# Patient Record
Sex: FEMALE | Race: BLACK OR AFRICAN AMERICAN | NOT HISPANIC OR LATINO | Employment: OTHER | URBAN - NONMETROPOLITAN AREA
[De-identification: names, ages, dates, MRNs, and addresses within clinical notes are randomized per-mention and may not be internally consistent; named-entity substitution may affect disease eponyms.]

---

## 2019-10-08 LAB
PAP RECOMMENDATION EXT: NORMAL
PAP SMEAR: NORMAL

## 2021-07-14 ENCOUNTER — OFFICE VISIT (OUTPATIENT)
Dept: FAMILY MEDICINE | Facility: CLINIC | Age: 31
End: 2021-07-14
Payer: MEDICAID

## 2021-07-14 VITALS
DIASTOLIC BLOOD PRESSURE: 84 MMHG | SYSTOLIC BLOOD PRESSURE: 128 MMHG | HEART RATE: 80 BPM | WEIGHT: 290.5 LBS | OXYGEN SATURATION: 98 % | RESPIRATION RATE: 16 BRPM | BODY MASS INDEX: 49.6 KG/M2 | HEIGHT: 64 IN | TEMPERATURE: 97 F

## 2021-07-14 DIAGNOSIS — E66.9 OBESITY, UNSPECIFIED CLASSIFICATION, UNSPECIFIED OBESITY TYPE, UNSPECIFIED WHETHER SERIOUS COMORBIDITY PRESENT: ICD-10-CM

## 2021-07-14 DIAGNOSIS — Z00.00 WELLNESS EXAMINATION: Primary | ICD-10-CM

## 2021-07-14 LAB
ALBUMIN SERPL BCP-MCNC: 3.5 G/DL (ref 3.5–5)
ALBUMIN/GLOB SERPL: 1.1 {RATIO}
ALP SERPL-CCNC: 70 U/L (ref 37–98)
ALT SERPL W P-5'-P-CCNC: 16 U/L (ref 13–56)
ANION GAP SERPL CALCULATED.3IONS-SCNC: 9 MMOL/L (ref 7–16)
AST SERPL W P-5'-P-CCNC: 14 U/L (ref 15–37)
BASOPHILS # BLD AUTO: 0.1 K/UL (ref 0–0.2)
BASOPHILS NFR BLD AUTO: 1 % (ref 0–1)
BILIRUB SERPL-MCNC: 0.3 MG/DL (ref 0–1.2)
BILIRUB SERPL-MCNC: ABNORMAL MG/DL
BLOOD URINE, POC: ABNORMAL
BUN SERPL-MCNC: 11 MG/DL (ref 7–18)
BUN/CREAT SERPL: 15 (ref 6–20)
CALCIUM SERPL-MCNC: 8.6 MG/DL (ref 8.5–10.1)
CHLORIDE SERPL-SCNC: 109 MMOL/L (ref 98–107)
CHOLEST SERPL-MCNC: 200 MG/DL (ref 0–200)
CHOLEST/HDLC SERPL: 5 {RATIO}
CO2 SERPL-SCNC: 28 MMOL/L (ref 21–32)
COLOR, POC UA: ABNORMAL
CREAT SERPL-MCNC: 0.75 MG/DL (ref 0.55–1.02)
DIFFERENTIAL METHOD BLD: ABNORMAL
EOSINOPHIL # BLD AUTO: 0.86 K/UL (ref 0–0.5)
EOSINOPHIL NFR BLD AUTO: 8.9 % (ref 1–4)
ERYTHROCYTE [DISTWIDTH] IN BLOOD BY AUTOMATED COUNT: 12.8 % (ref 11.5–14.5)
EST. AVERAGE GLUCOSE BLD GHB EST-MCNC: 77 MG/DL
GLOBULIN SER-MCNC: 3.3 G/DL (ref 2–4)
GLUCOSE SERPL-MCNC: 96 MG/DL (ref 74–106)
GLUCOSE UR QL STRIP: NEGATIVE
HBA1C MFR BLD HPLC: 4.9 % (ref 4.5–6.6)
HCT VFR BLD AUTO: 39.4 % (ref 38–47)
HDLC SERPL-MCNC: 40 MG/DL (ref 40–60)
HGB BLD-MCNC: 14.1 G/DL (ref 12–16)
IMM GRANULOCYTES # BLD AUTO: 0.02 K/UL (ref 0–0.04)
IMM GRANULOCYTES NFR BLD: 0.2 % (ref 0–0.4)
KETONES UR QL STRIP: NEGATIVE
LDLC SERPL CALC-MCNC: 142 MG/DL
LDLC/HDLC SERPL: 3.6 {RATIO}
LEUKOCYTE ESTERASE URINE, POC: NEGATIVE
LYMPHOCYTES # BLD AUTO: 3.62 K/UL (ref 1–4.8)
LYMPHOCYTES NFR BLD AUTO: 37.5 % (ref 27–41)
MCH RBC QN AUTO: 30.7 PG (ref 27–31)
MCHC RBC AUTO-ENTMCNC: 35.8 G/DL (ref 32–36)
MCV RBC AUTO: 85.8 FL (ref 80–96)
MONOCYTES # BLD AUTO: 0.59 K/UL (ref 0–0.8)
MONOCYTES NFR BLD AUTO: 6.1 % (ref 2–6)
MPC BLD CALC-MCNC: 10.4 FL (ref 9.4–12.4)
NEUTROPHILS # BLD AUTO: 4.47 K/UL (ref 1.8–7.7)
NEUTROPHILS NFR BLD AUTO: 46.3 % (ref 53–65)
NITRITE, POC UA: NEGATIVE
NONHDLC SERPL-MCNC: 160 MG/DL
NRBC # BLD AUTO: 0 X10E3/UL
NRBC, AUTO (.00): 0 %
PH, POC UA: 7.5
PLATELET # BLD AUTO: 308 K/UL (ref 150–400)
POTASSIUM SERPL-SCNC: 4.2 MMOL/L (ref 3.5–5.1)
PROT SERPL-MCNC: 6.8 G/DL (ref 6.4–8.2)
PROTEIN, POC: ABNORMAL
RBC # BLD AUTO: 4.59 M/UL (ref 4.2–5.4)
SODIUM SERPL-SCNC: 142 MMOL/L (ref 136–145)
SPECIFIC GRAVITY, POC UA: 1.02
T4 FREE SERPL-MCNC: 1.02 NG/DL (ref 0.76–1.46)
TRIGL SERPL-MCNC: 89 MG/DL (ref 35–150)
TSH SERPL DL<=0.005 MIU/L-ACNC: 4.81 UIU/ML (ref 0.36–3.74)
UROBILINOGEN, POC UA: ABNORMAL
VLDLC SERPL-MCNC: 18 MG/DL
WBC # BLD AUTO: 9.66 K/UL (ref 4.5–11)

## 2021-07-14 PROCEDURE — 80061 LIPID PANEL: CPT | Mod: ,,, | Performed by: CLINICAL MEDICAL LABORATORY

## 2021-07-14 PROCEDURE — 83036 HEMOGLOBIN A1C: ICD-10-PCS | Mod: ,,, | Performed by: CLINICAL MEDICAL LABORATORY

## 2021-07-14 PROCEDURE — 85025 COMPLETE CBC W/AUTO DIFF WBC: CPT | Mod: ,,, | Performed by: CLINICAL MEDICAL LABORATORY

## 2021-07-14 PROCEDURE — 99385 PR PREVENTIVE VISIT,NEW,18-39: ICD-10-PCS | Mod: ,,, | Performed by: NURSE PRACTITIONER

## 2021-07-14 PROCEDURE — 99385 PREV VISIT NEW AGE 18-39: CPT | Mod: ,,, | Performed by: NURSE PRACTITIONER

## 2021-07-14 PROCEDURE — 84439 ASSAY OF FREE THYROXINE: CPT | Mod: ,,, | Performed by: CLINICAL MEDICAL LABORATORY

## 2021-07-14 PROCEDURE — 84439 T4, FREE: ICD-10-PCS | Mod: ,,, | Performed by: CLINICAL MEDICAL LABORATORY

## 2021-07-14 PROCEDURE — 80053 COMPREHEN METABOLIC PANEL: CPT | Mod: ,,, | Performed by: CLINICAL MEDICAL LABORATORY

## 2021-07-14 PROCEDURE — 81003 URINALYSIS AUTO W/O SCOPE: CPT | Mod: RHCUB | Performed by: NURSE PRACTITIONER

## 2021-07-14 PROCEDURE — 83036 HEMOGLOBIN GLYCOSYLATED A1C: CPT | Mod: ,,, | Performed by: CLINICAL MEDICAL LABORATORY

## 2021-07-14 PROCEDURE — 80061 LIPID PANEL: ICD-10-PCS | Mod: ,,, | Performed by: CLINICAL MEDICAL LABORATORY

## 2021-07-14 PROCEDURE — 84443 ASSAY THYROID STIM HORMONE: CPT | Mod: ,,, | Performed by: CLINICAL MEDICAL LABORATORY

## 2021-07-14 PROCEDURE — 85025 CBC WITH DIFFERENTIAL: ICD-10-PCS | Mod: ,,, | Performed by: CLINICAL MEDICAL LABORATORY

## 2021-07-14 PROCEDURE — 80053 COMPREHENSIVE METABOLIC PANEL: ICD-10-PCS | Mod: ,,, | Performed by: CLINICAL MEDICAL LABORATORY

## 2021-07-14 PROCEDURE — 84443 TSH: ICD-10-PCS | Mod: ,,, | Performed by: CLINICAL MEDICAL LABORATORY

## 2021-12-28 ENCOUNTER — OFFICE VISIT (OUTPATIENT)
Dept: FAMILY MEDICINE | Facility: CLINIC | Age: 31
End: 2021-12-28
Payer: MEDICAID

## 2021-12-28 VITALS
DIASTOLIC BLOOD PRESSURE: 75 MMHG | RESPIRATION RATE: 18 BRPM | TEMPERATURE: 97 F | HEIGHT: 64 IN | BODY MASS INDEX: 46.99 KG/M2 | OXYGEN SATURATION: 100 % | SYSTOLIC BLOOD PRESSURE: 124 MMHG | HEART RATE: 82 BPM | WEIGHT: 275.25 LBS

## 2021-12-28 DIAGNOSIS — M54.30 SCIATICA, UNSPECIFIED LATERALITY: ICD-10-CM

## 2021-12-28 DIAGNOSIS — Z87.448 HX OF HEMATURIA: ICD-10-CM

## 2021-12-28 DIAGNOSIS — R94.6 ABNORMAL THYROID FUNCTION TEST: ICD-10-CM

## 2021-12-28 DIAGNOSIS — M54.9 BACK PAIN, UNSPECIFIED BACK LOCATION, UNSPECIFIED BACK PAIN LATERALITY, UNSPECIFIED CHRONICITY: Primary | ICD-10-CM

## 2021-12-28 LAB
BILIRUB SERPL-MCNC: NORMAL MG/DL
BLOOD URINE, POC: NEGATIVE
COLOR, POC UA: YELLOW
GLUCOSE UR QL STRIP: NEGATIVE
KETONES UR QL STRIP: NORMAL
LEUKOCYTE ESTERASE URINE, POC: NEGATIVE
NITRITE, POC UA: NEGATIVE
PH, POC UA: 5.5
PROTEIN, POC: NORMAL
SPECIFIC GRAVITY, POC UA: 1.03
T4 FREE SERPL-MCNC: 1.07 NG/DL (ref 0.76–1.46)
TSH SERPL DL<=0.005 MIU/L-ACNC: 2.3 UIU/ML (ref 0.36–3.74)
UROBILINOGEN, POC UA: 0.2

## 2021-12-28 PROCEDURE — 96372 THER/PROPH/DIAG INJ SC/IM: CPT | Mod: ,,, | Performed by: NURSE PRACTITIONER

## 2021-12-28 PROCEDURE — 3074F PR MOST RECENT SYSTOLIC BLOOD PRESSURE < 130 MM HG: ICD-10-PCS | Mod: CPTII,,, | Performed by: NURSE PRACTITIONER

## 2021-12-28 PROCEDURE — 84443 ASSAY THYROID STIM HORMONE: CPT | Mod: ,,, | Performed by: CLINICAL MEDICAL LABORATORY

## 2021-12-28 PROCEDURE — 84439 ASSAY OF FREE THYROXINE: CPT | Mod: ,,, | Performed by: CLINICAL MEDICAL LABORATORY

## 2021-12-28 PROCEDURE — 3008F BODY MASS INDEX DOCD: CPT | Mod: CPTII,,, | Performed by: NURSE PRACTITIONER

## 2021-12-28 PROCEDURE — 1159F MED LIST DOCD IN RCRD: CPT | Mod: CPTII,,, | Performed by: NURSE PRACTITIONER

## 2021-12-28 PROCEDURE — 84443 TSH: ICD-10-PCS | Mod: ,,, | Performed by: CLINICAL MEDICAL LABORATORY

## 2021-12-28 PROCEDURE — 3008F PR BODY MASS INDEX (BMI) DOCUMENTED: ICD-10-PCS | Mod: CPTII,,, | Performed by: NURSE PRACTITIONER

## 2021-12-28 PROCEDURE — 3078F PR MOST RECENT DIASTOLIC BLOOD PRESSURE < 80 MM HG: ICD-10-PCS | Mod: CPTII,,, | Performed by: NURSE PRACTITIONER

## 2021-12-28 PROCEDURE — 3074F SYST BP LT 130 MM HG: CPT | Mod: CPTII,,, | Performed by: NURSE PRACTITIONER

## 2021-12-28 PROCEDURE — 3078F DIAST BP <80 MM HG: CPT | Mod: CPTII,,, | Performed by: NURSE PRACTITIONER

## 2021-12-28 PROCEDURE — 99214 PR OFFICE/OUTPT VISIT, EST, LEVL IV, 30-39 MIN: ICD-10-PCS | Mod: 25,,, | Performed by: NURSE PRACTITIONER

## 2021-12-28 PROCEDURE — 1159F PR MEDICATION LIST DOCUMENTED IN MEDICAL RECORD: ICD-10-PCS | Mod: CPTII,,, | Performed by: NURSE PRACTITIONER

## 2021-12-28 PROCEDURE — 1160F RVW MEDS BY RX/DR IN RCRD: CPT | Mod: CPTII,,, | Performed by: NURSE PRACTITIONER

## 2021-12-28 PROCEDURE — 1160F PR REVIEW ALL MEDS BY PRESCRIBER/CLIN PHARMACIST DOCUMENTED: ICD-10-PCS | Mod: CPTII,,, | Performed by: NURSE PRACTITIONER

## 2021-12-28 PROCEDURE — 96372 PR INJECTION,THERAP/PROPH/DIAG2ST, IM OR SUBCUT: ICD-10-PCS | Mod: ,,, | Performed by: NURSE PRACTITIONER

## 2021-12-28 PROCEDURE — 84439 T4, FREE: ICD-10-PCS | Mod: ,,, | Performed by: CLINICAL MEDICAL LABORATORY

## 2021-12-28 PROCEDURE — 81003 URINALYSIS AUTO W/O SCOPE: CPT | Mod: RHCUB | Performed by: NURSE PRACTITIONER

## 2021-12-28 PROCEDURE — 99214 OFFICE O/P EST MOD 30 MIN: CPT | Mod: 25,,, | Performed by: NURSE PRACTITIONER

## 2021-12-28 RX ORDER — KETOROLAC TROMETHAMINE 30 MG/ML
30 INJECTION, SOLUTION INTRAMUSCULAR; INTRAVENOUS
Status: COMPLETED | OUTPATIENT
Start: 2021-12-28 | End: 2021-12-28

## 2021-12-28 RX ADMIN — KETOROLAC TROMETHAMINE 30 MG: 30 INJECTION, SOLUTION INTRAMUSCULAR; INTRAVENOUS at 03:12

## 2021-12-28 NOTE — PROGRESS NOTES
Ivelisse Rodriguez, PRIYANKA   [facility]      PATIENT NAME: Tone Nagy  : 1990  DATE: 21  MRN: [id number]      Patient PCP Information     Provider PCP Type    Primary Doctor No General          Reason for Visit / Chief Complaint: Hip Pain (Right , pt states pain begins at hip, then travels to leg/RM8) and Leg Pain         History of Present Illness / Problem Focused Workflow     Tone Nagy presents to the clinic with Hip Pain (Right , pt states pain begins at hip, then travels to leg/RM8) and Leg Pain     HPI   Patient with continued right lower back pain and sciatica. Difficulty worse getting in and out of bed. Patient took over the counter naproxen some relief temporarily. New Referral needed for pain mgmt     Hx of abnormal thyroid studies and ua, follow up studies today.     LMP 2021      Review of Systems     Review of Systems   Constitutional: Negative for activity change, appetite change, chills, diaphoresis, fatigue, fever and unexpected weight change.   HENT: Negative for congestion, ear pain, facial swelling, hearing loss, nosebleeds and sore throat.    Respiratory: Negative for apnea, cough, shortness of breath and wheezing.    Cardiovascular: Negative for chest pain, palpitations and leg swelling.   Gastrointestinal: Negative for abdominal distention, abdominal pain, blood in stool, constipation, diarrhea and nausea.   Endocrine: Negative for cold intolerance, heat intolerance, polydipsia, polyphagia and polyuria.   Genitourinary: Negative for decreased urine volume, difficulty urinating, dysuria, flank pain, frequency, hematuria and urgency.   Musculoskeletal: Positive for back pain. Negative for arthralgias, joint swelling and myalgias.   Skin: Negative for color change and rash.   Neurological: Negative for dizziness, tremors, seizures, syncope, facial asymmetry, speech difficulty, weakness, light-headedness, numbness and headaches.   Hematological:  Negative for adenopathy. Does not bruise/bleed easily.   Psychiatric/Behavioral: Negative for behavioral problems and confusion.       Medical / Social / Family History     Past Medical History:   Diagnosis Date    Gallstones        History reviewed. No pertinent surgical history.    Social History  Ms.  reports that she has never smoked. She does not have any smokeless tobacco history on file. She reports that she does not drink alcohol and does not use drugs.    Family History  Ms.'s family history includes Breast cancer in her maternal grandmother; Diabetes in her maternal grandmother; Hypertension in her maternal grandmother; Hypothyroidism in her maternal grandmother; Kidney disease in her maternal grandfather.    Medications and Allergies     Medications  No outpatient medications have been marked as taking for the 12/28/21 encounter (Office Visit) with PRIYANKA Danielle.       Allergies  Review of patient's allergies indicates:  No Known Allergies    Physical Examination     Vitals:    12/28/21 1423   BP: 124/75   Pulse: 82   Resp: 18   Temp: 97.2 °F (36.2 °C)     Physical Exam  Vitals reviewed.   Constitutional:       Appearance: She is obese.   HENT:      Head: Normocephalic.      Right Ear: External ear normal.      Left Ear: External ear normal.      Nose: Nose normal.      Mouth/Throat:      Mouth: Mucous membranes are moist.   Eyes:      Extraocular Movements: Extraocular movements intact.   Cardiovascular:      Rate and Rhythm: Normal rate.      Pulses: Normal pulses.   Pulmonary:      Effort: Pulmonary effort is normal.   Musculoskeletal:         General: No swelling, tenderness, deformity or signs of injury. Normal range of motion.      Right lower leg: No edema.      Left lower leg: No edema.   Skin:     General: Skin is warm and dry.      Capillary Refill: Capillary refill takes less than 2 seconds.   Neurological:      General: No focal deficit present.      Mental Status: She is alert and  oriented to person, place, and time.   Psychiatric:         Mood and Affect: Mood normal.         Behavior: Behavior normal.         Thought Content: Thought content normal.         Judgment: Judgment normal.           Office Visit on 12/28/2021   Component Date Value Ref Range Status    Color, UA 12/28/2021 Yellow   Final    Spec Grav UA 12/28/2021 1.030   Final    pH, UA 12/28/2021 5.5   Final    WBC, UA 12/28/2021 Negative   Final    Nitrite, UA 12/28/2021 Negative   Final    Protein, UA 12/28/2021 30mg   Final    Glucose, UA 12/28/2021 Negative   Final    Ketones, UA 12/28/2021 15mg   Final    Bilirubin, UA 12/28/2021 Small   Final    Urobilinogen, UA 12/28/2021 0.2   Final    Blood, UA 12/28/2021 Negative   Final    TSH 12/28/2021 2.300  0.358 - 3.740 uIU/mL Final    Free T4 12/28/2021 1.07  0.76 - 1.46 ng/dL Final             Assessment and Plan (including Health Maintenance)     Plan:     Sciatica, unspecified laterality  Back pain, unspecified back location, unspecified back pain laterality, unspecified chronicity  -     Ambulatory referral/consult to Pain Clinic; Future; Expected date: 01/04/2022  -     ketorolac injection 30 mg    Abnormal thyroid function test  -     TSH; Future; Expected date: 12/28/2021  -     T4, Free; Future; Expected date: 12/28/2021    Hx of hematuria  -     POCT URINALYSIS W/O SCOPE negative              There are no Patient Instructions on file for this visit.       Health Maintenance Due   Topic Date Due    Hepatitis C Screening  Never done    COVID-19 Vaccine (1) Never done    HIV Screening  Never done    TETANUS VACCINE  Never done    Cervical Cancer Screening  Never done    Influenza Vaccine (1) Never done         There is no immunization history on file for this patient.     Problem List Items Addressed This Visit    None     Visit Diagnoses     Back pain, unspecified back location, unspecified back pain laterality, unspecified chronicity    -  Primary     Relevant Orders    Ambulatory referral/consult to Pain Clinic    Sciatica, unspecified laterality        Relevant Orders    Ambulatory referral/consult to Pain Clinic    Abnormal thyroid function test        Relevant Orders    TSH (Completed)    T4, Free (Completed)    Hx of hematuria        Relevant Orders    POCT URINALYSIS W/O SCOPE (Completed)          The patient has no Health Maintenance topics of status Not Due    Future Appointments   Date Time Provider Department Center   2/7/2022  1:30 PM Arabella Blanco MD [facility]            Signature:  PRIYANKA Danielle [facility]     Date of encounter: 12/28/21

## 2022-02-07 ENCOUNTER — OFFICE VISIT (OUTPATIENT)
Dept: PAIN MEDICINE | Facility: CLINIC | Age: 32
End: 2022-02-07
Payer: MEDICAID

## 2022-02-07 VITALS
WEIGHT: 285 LBS | BODY MASS INDEX: 52.44 KG/M2 | HEIGHT: 62 IN | HEART RATE: 92 BPM | DIASTOLIC BLOOD PRESSURE: 90 MMHG | SYSTOLIC BLOOD PRESSURE: 149 MMHG

## 2022-02-07 DIAGNOSIS — M54.17 LUMBOSACRAL RADICULOPATHY: ICD-10-CM

## 2022-02-07 DIAGNOSIS — M54.9 BACK PAIN, UNSPECIFIED BACK LOCATION, UNSPECIFIED BACK PAIN LATERALITY, UNSPECIFIED CHRONICITY: ICD-10-CM

## 2022-02-07 DIAGNOSIS — Z79.899 ENCOUNTER FOR LONG-TERM (CURRENT) USE OF OTHER MEDICATIONS: Primary | ICD-10-CM

## 2022-02-07 DIAGNOSIS — M54.30 SCIATICA, UNSPECIFIED LATERALITY: ICD-10-CM

## 2022-02-07 LAB
CTP QC/QA: YES
POC (AMP) AMPHETAMINE: ABNORMAL
POC (BAR) BARBITURATES: NEGATIVE
POC (BUP) BUPRENORPHINE: NEGATIVE
POC (BZO) BENZODIAZEPINES: NEGATIVE
POC (COC) COCAINE: NEGATIVE
POC (MDMA) METHYLENEDIOXYMETHAMPHETAMINE 3,4: NEGATIVE
POC (MET) METHAMPHETAMINE: NEGATIVE
POC (MOP) OPIATES: NEGATIVE
POC (MTD) METHADONE: NEGATIVE
POC (OXY) OXYCODONE: NEGATIVE
POC (PCP) PHENCYCLIDINE: NEGATIVE
POC (TCA) TRICYCLIC ANTIDEPRESSANTS: NEGATIVE
POC TEMPERATURE (URINE): 94
POC THC: ABNORMAL

## 2022-02-07 PROCEDURE — 99214 OFFICE O/P EST MOD 30 MIN: CPT | Mod: PBBFAC | Performed by: PAIN MEDICINE

## 2022-02-07 PROCEDURE — 3080F PR MOST RECENT DIASTOLIC BLOOD PRESSURE >= 90 MM HG: ICD-10-PCS | Mod: CPTII,,, | Performed by: PAIN MEDICINE

## 2022-02-07 PROCEDURE — G0481 DRUG TEST DEF 8-14 CLASSES: HCPCS | Mod: ,,, | Performed by: CLINICAL MEDICAL LABORATORY

## 2022-02-07 PROCEDURE — 3080F DIAST BP >= 90 MM HG: CPT | Mod: CPTII,,, | Performed by: PAIN MEDICINE

## 2022-02-07 PROCEDURE — 3008F PR BODY MASS INDEX (BMI) DOCUMENTED: ICD-10-PCS | Mod: CPTII,,, | Performed by: PAIN MEDICINE

## 2022-02-07 PROCEDURE — 99204 OFFICE O/P NEW MOD 45 MIN: CPT | Mod: S$PBB,,, | Performed by: PAIN MEDICINE

## 2022-02-07 PROCEDURE — 80305 DRUG TEST PRSMV DIR OPT OBS: CPT | Mod: PBBFAC | Performed by: PAIN MEDICINE

## 2022-02-07 PROCEDURE — G0481 PR DRUG TEST DEF 8-14 CLASSES: ICD-10-PCS | Mod: ,,, | Performed by: CLINICAL MEDICAL LABORATORY

## 2022-02-07 PROCEDURE — 1159F MED LIST DOCD IN RCRD: CPT | Mod: CPTII,,, | Performed by: PAIN MEDICINE

## 2022-02-07 PROCEDURE — 3008F BODY MASS INDEX DOCD: CPT | Mod: CPTII,,, | Performed by: PAIN MEDICINE

## 2022-02-07 PROCEDURE — 99204 PR OFFICE/OUTPT VISIT, NEW, LEVL IV, 45-59 MIN: ICD-10-PCS | Mod: S$PBB,,, | Performed by: PAIN MEDICINE

## 2022-02-07 PROCEDURE — 3077F SYST BP >= 140 MM HG: CPT | Mod: CPTII,,, | Performed by: PAIN MEDICINE

## 2022-02-07 PROCEDURE — 1159F PR MEDICATION LIST DOCUMENTED IN MEDICAL RECORD: ICD-10-PCS | Mod: CPTII,,, | Performed by: PAIN MEDICINE

## 2022-02-07 PROCEDURE — 3077F PR MOST RECENT SYSTOLIC BLOOD PRESSURE >= 140 MM HG: ICD-10-PCS | Mod: CPTII,,, | Performed by: PAIN MEDICINE

## 2022-02-07 RX ORDER — GABAPENTIN 300 MG/1
300 CAPSULE ORAL 3 TIMES DAILY
Qty: 90 CAPSULE | Refills: 11 | Status: SHIPPED | OUTPATIENT
Start: 2022-02-07 | End: 2022-06-14 | Stop reason: SDUPTHER

## 2022-02-07 RX ORDER — IBUPROFEN 200 MG
200 TABLET ORAL EVERY 6 HOURS PRN
COMMUNITY
End: 2022-06-14

## 2022-02-07 NOTE — PROGRESS NOTES
Pt is here in room 7 for new pt referral from Ivelissemargie Rodriguez [facility] for back pain.  Pt has not had any diagnostics, no physical therapy, no chiro or other pain clinics.  Pt has tried aleve and ibuprofen.

## 2022-02-07 NOTE — PROGRESS NOTES
"Chronic Pain - New Consult    Referring Physician: Ivelisse Rodriguez FNP       SUBJECTIVE: Disclaimer: This note has been generated using voice-recognition software. There may be typographical errors that have been missed during proof-reading      Initial encounter:    Tone Nagy presents to the clinic for the evaluation of lower back and right radicular pain.       31-year-old female presents for new patient evaluation and consultation from Ivelisse Virk NP.  Patient reports an onset of lower back pain,  March of 2020,  after an epidural vaginal delivery.  The pain has remained chronic and radiates from the lower back to the right lower extremity.  NSAIDs have failed to provide any relief.  She has not been involved in physical therapy nor received an MRI or EMG.  She presents for evaluation and treatment.        Pain Assessment  Pain Score:   4  Pain Location: Back  Pain Orientation: Right  Pain Radiating Towards: lower back pain radiates to right leg  Pain Descriptors: Aching,Constant,Dull  Pain Frequency: Continuous  Pain Onset: Awakened from sleep  Clinical Progression: Gradually worsening  Aggravating Factors: Standing,Walking  Pain Intervention(s): Medication (See eMAR)      Physical Therapy/Home Exercise: no        Pain Medications:  has a current medication list which includes the following prescription(s): ibuprofen and gabapentin.      Tried in Past:  NSAIDS-yes  TCA-no  SNRI-no  Anti-convulsants-no  Muscle Relaxants-no  Opioids-no  Benzodiazepines-no     4A"s of Opioid Risk Assessment  Activity: Patient can  perform  ADL  Analgesia:  Patient's pain is partially controlled by current medication.   Aberrant Behavior:  reviewed with no aberrant drug seeking/taking behavior     report:  Reviewed and consistent with medication use as prescribed.    Patient denies suicidal or homicidal ideations    Pain interventional therapy-no    Chiropractor -no  Acupuncture - no  TENS unit -no  Spinal " "decompression -no  Joint replacement -no     Review of Systems   Constitutional: Negative.    HENT: Negative.    Eyes: Negative.    Respiratory: Negative.    Cardiovascular: Negative.    Gastrointestinal: Negative.    Endocrine: Negative.    Genitourinary: Negative.    Musculoskeletal: Positive for back pain and gait problem.   Integumentary:  Negative.   Neurological: Positive for weakness (RLE) and numbness (RLE).   Hematological: Negative.    Psychiatric/Behavioral: Negative.              No image results found.         Past Medical History:   Diagnosis Date    Gallstones      Past Surgical History:   Procedure Laterality Date    LAPAROSCOPIC SURGICAL REMOVAL OF CYST OF OVARY       Social History     Socioeconomic History    Marital status:      Spouse name: Pedro Nagy    Number of children: 3    Highest education level: Associate degree: occupational, technical, or vocational program   Tobacco Use    Smoking status: Current Every Day Smoker    Smokeless tobacco: Never Used   Substance and Sexual Activity    Alcohol use: Never    Drug use: Yes     Types: Marijuana    Sexual activity: Yes     Partners: Male     Birth control/protection: Implant     Family History   Problem Relation Age of Onset    Hypertension Maternal Grandmother     Diabetes Maternal Grandmother     Breast cancer Maternal Grandmother     Hypothyroidism Maternal Grandmother     Kidney disease Maternal Grandfather      Review of patient's allergies indicates:  No Known Allergies      OBJECTIVE:  Vitals:    02/07/22 1416   BP: (!) 149/90   Pulse: 92     BP (!) 149/90   Pulse 92   Ht 5' 2" (1.575 m)   Wt 129.3 kg (285 lb)   BMI 52.13 kg/m²   Physical Exam  Vitals and nursing note reviewed.   Constitutional:       General: She is not in acute distress.     Appearance: Normal appearance. She is obese. She is not ill-appearing, toxic-appearing or diaphoretic.   HENT:      Head: Normocephalic and atraumatic.      Nose: Nose " normal.      Mouth/Throat:      Mouth: Mucous membranes are moist.   Eyes:      Extraocular Movements: Extraocular movements intact.      Pupils: Pupils are equal, round, and reactive to light.   Cardiovascular:      Rate and Rhythm: Normal rate and regular rhythm.      Heart sounds: Normal heart sounds.   Pulmonary:      Effort: Pulmonary effort is normal. No respiratory distress.      Breath sounds: Normal breath sounds. No stridor. No wheezing or rhonchi.   Abdominal:      General: Bowel sounds are normal.      Palpations: Abdomen is soft.   Musculoskeletal:         General: No swelling or deformity.      Cervical back: Normal and normal range of motion. No spasms or tenderness. No pain with movement. Normal range of motion.      Thoracic back: Normal.      Lumbar back: Tenderness and bony tenderness present. No spasms. Decreased range of motion. Positive right straight leg raise test. Negative left straight leg raise test. No scoliosis.      Right lower leg: No edema.      Left lower leg: No edema.      Comments: Lumbar pain with flexion and extension   Skin:     General: Skin is warm.   Neurological:      General: No focal deficit present.      Mental Status: She is alert and oriented to person, place, and time. Mental status is at baseline.      Cranial Nerves: Cranial nerves are intact. No cranial nerve deficit.      Sensory: Sensation is intact. No sensory deficit.      Motor: No weakness.      Coordination: Coordination normal.      Gait: Gait normal.      Deep Tendon Reflexes: Reflexes are normal and symmetric.   Psychiatric:         Mood and Affect: Mood normal.         Behavior: Behavior normal.            ASSESSMENT: 31 y.o. year old female with pain, consistent with     Encounter Diagnoses   Name Primary?    Back pain, unspecified back location, unspecified back pain laterality, unspecified chronicity     Sciatica, unspecified laterality     Encounter for long-term (current) use of other medications  Yes    Lumbosacral radiculopathy         PLAN:   1. reviewed  2..Addiction, Dependency, Tolerance, Opioid abuse-misuse, Death, Diversion Discussed. Overdose reversal drug Naloxone discussed  2.UDS point of care obtained for new patient evaluation and consultation. We will obtain a definitve UDS for confirmation.  3. Opioid contract signed today  4.Refill/ Continue medications for pain control and function.  Start gabapentin 300 mg and increase to 3 times a day dosing as tolerated for right lower extremity radicular pain       Requested Prescriptions     Signed Prescriptions Disp Refills    gabapentin (NEURONTIN) 300 MG capsule 90 capsule 11     Sig: Take 1 capsule (300 mg total) by mouth 3 (three) times daily.     5. Urine drug screen and confirmation testing was ordered as documented on the requisition form in order to verify medication compliance, test for illicit substances.  6. Start physical therapy 2 to 3 times a week x6 weeks for lumbar radiculopathy    Orders Placed This Encounter   Procedures    Drug Screen Definitive 14, Urine     Standing Status:   Future     Number of Occurrences:   1     Standing Expiration Date:   4/8/2023     Order Specific Question:   Specimen Source     Answer:   Urine    Ambulatory Referral/consult to [facility]     Standing Status:   Future     Standing Expiration Date:   3/7/2023     Referral Priority:   Routine     Referral Type:   Consultation     Number of Visits Requested:   1    POCT Urine Drug Screen Presump     Interpretive Information:     Negative:  No drug detected at the cut off level.   Positive:  This result represents presumptive positive for the   tested drug, other substances may yield a positive response other   than the analyte of interest. This result should be utilized for   diagnostic purpose only. Confirmation testing will be performed upon physician request only.         7. Consider MRI versus EMG after completion of  physical therapy and if indicated  8. Return in 1 month for re-evaluation and treatment options    The total time spent for evaluation and management on 02/10/2022 including reviewing separately obtained history, performing a medically appropriate exam and evaluation, documenting clinical information in the health record, independently interpreting results and communicating them to the patient/family/caregiver, and ordering medications/tests/procedures was between 15-29 minutes.    The above plan and management options were discussed at length with patient. Patient is in agreement with the above and verbalized understanding. It will be communicated with the referring physician via electronic record, fax, or mail.    Arabella Blanco  02/10/2022

## 2022-02-09 LAB
6-ACETYLMORPHINE, URINE (RUSH): NEGATIVE 10 NG/ML
7-AMINOCLONAZEPAM, URINE (RUSH): NEGATIVE 25 NG/ML
A-HYDROXYALPRAZOLAM, URINE (RUSH): NEGATIVE 25 NG/ML
ACETYL FENTANYL, URINE (RUSH): NEGATIVE 2.5 NG/ML
ACETYL NORFENTANYL OXALATE, URINE (RUSH): NEGATIVE 5 NG/ML
AMPHET UR QL SCN: NEGATIVE 100 NG/ML
BENZOYLECGONINE, URINE (RUSH): NEGATIVE 100 NG/ML
BUPRENORPHINE UR QL SCN: NEGATIVE 25 NG/ML
CODEINE, URINE (RUSH): NEGATIVE 25 NG/ML
CREAT UR-MCNC: 366 MG/DL (ref 28–219)
EDDP, URINE (RUSH): NEGATIVE 25 NG/ML
FENTANYL, URINE (RUSH): NEGATIVE 2.5 NG/ML
HYDROCODONE, URINE (RUSH): NEGATIVE 25 NG/ML
HYDROMORPHONE, URINE (RUSH): NEGATIVE 25 NG/ML
LORAZEPAM, URINE (RUSH): NEGATIVE 25 NG/ML
METHADONE UR QL SCN: NEGATIVE 25 NG/ML
METHAMPHET UR QL SCN: NEGATIVE 100 NG/ML
MORPHINE, URINE (RUSH): NEGATIVE 25 NG/ML
NORBUPRENORPHINE, URINE (RUSH): NEGATIVE 25 NG/ML
NORDIAZEPAM, URINE (RUSH): NEGATIVE 25 NG/ML
NORFENTANYL OXALATE, URINE (RUSH): NEGATIVE 5 NG/ML
NORHYDROCODONE, URINE (RUSH): NEGATIVE 50 NG/ML
NOROXYCODONE HCL, URINE (RUSH): NEGATIVE 50 NG/ML
OXAZEPAM, URINE (RUSH): NEGATIVE 25 NG/ML
OXYCODONE UR QL SCN: NEGATIVE 25 NG/ML
OXYMORPHONE, URINE (RUSH): NEGATIVE 25 NG/ML
PH UR STRIP: 6 PH UNITS
SP GR UR STRIP: >=1.03
TAPENTADOL, URINE (RUSH): NEGATIVE 25 NG/ML
TEMAZEPAM, URINE (RUSH): NEGATIVE 25 NG/ML
THC-COOH, URINE (RUSH): >250 25 NG/ML
TRAMADOL, URINE (RUSH): NEGATIVE 100 NG/ML

## 2022-02-23 ENCOUNTER — CLINICAL SUPPORT (OUTPATIENT)
Dept: REHABILITATION | Facility: HOSPITAL | Age: 32
End: 2022-02-23
Attending: PAIN MEDICINE
Payer: MEDICAID

## 2022-02-23 DIAGNOSIS — M53.3 SACROILIAC JOINT DYSFUNCTION OF RIGHT SIDE: ICD-10-CM

## 2022-02-23 DIAGNOSIS — M54.17 LUMBOSACRAL RADICULOPATHY: ICD-10-CM

## 2022-02-23 DIAGNOSIS — G57.01 PIRIFORMIS SYNDROME, RIGHT: Primary | ICD-10-CM

## 2022-02-23 PROCEDURE — 97161 PT EVAL LOW COMPLEX 20 MIN: CPT

## 2022-02-23 NOTE — PLAN OF CARE
[facility]   Physical Therapy Initial Evaluation    Name: Tone Nagy  Clinic Number: [id number]    Therapy Diagnosis:   Encounter Diagnosis   Name Primary?    Lumbosacral radiculopathy      Physician: Arabella Blanco MD    Physician Orders: PT Eval and Treat   Medical Diagnosis from Referral: lumbosacral radiculopathy  Evaluation Date: 2/23/2022  Authorization Period Expiration: 02/22/2022 (evaluation only approved)  Plan of Care Expiration: 04/22/2022  Progress Note Due: 3/23/2022  Visit # / Visits authorized: 1/ 1    Time In: 328 pm  Time Out: 415 pm  Total Appointment Time (timed & untimed codes): 47 minutes    Precautions: Standard    Subjective   Date of onset: March 2020  History of current condition - Tone reports: started having some back pain after giving birth in March 2020 - says she had an epidural - the pain has been progressively getting worse over the last couple of years - reports symptoms into right lower extremity that go all the way to her foot - pain is activity dependent - worse with standing and bending over to floor - 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    Medications:   Tone has a current medication list which includes the following prescription(s): gabapentin and ibuprofen.    Allergies:   Review of patient's allergies indicates:  No Known Allergies     Imaging, none, MD wants MRI but Medicaid requires physical therapy prior    Prior Therapy: none  Social History:  lives with their family  Occupation:   Prior Level of Function: independent   Current Level of Function: independent but has difficulty standing > 30 minutes and bending over to floor to put on socks and  shoes    Pain:  Current 0/10, worst 6-7/10, best 0/10   Location: right back  and lower extremity    Description: Aching, Tingling, Sharp and Shooting  Aggravating Factors: Standing, Bending, hygiene after using restroom, and Night Time  Easing Factors: massage and pain medication    Pts goals: be able to stand without pain in back or right lower extremity     Objective     Posture:  1. Standing lordosis: Increased  2. Sitting lordosis: Increased  3. Iliac crest height:   4. PSIS height:   5. Pelvic rotation/torsion: yes - posterior - assumed based on right lower extremity short in supine and lengthens in long sit  6. Scoliosis: no  7. Lateral shift: no  8. Comments: unable to palpate ASIS, PSIS, or iliac crests     Forward bend: WFL  Back bend: WFL  Lateral trunk lean: right WFL left WFL  Trunk rotation: right WFL left WFL    MANUAL MUSCLE TEST  Right left   Hip flexion MMT strength: 4+/5 MMT strength: 5/5   Hip extension  MMT strength: 4/5 MMT strength: 5/5   Hip adduction  MMT strength: 4/5 MMT strength: 5/5   Hip abduction MMT strength: 4+/5 MMT strength: 5/5   Knee extension MMT strength: 5/5 MMT strength: 5/5   Knee flexion  MMT strength: 5/5 MMT strength: 5/5   Ankle dorsiflexion MMT strength: 5/5 MMT strength: 5/5   Ankle plantar flexion  MMT strength: 5/5 MMT strength: 5/5     ROM/flexibility right left   Hip flexion (120) 95 degrees  95 degrees    Internal rotation (45) 30 degrees  30 degrees    External rotation (45) 40 degrees  40 degrees    Hamstring 90/90 (-10) -20 degrees -15 degrees   Rectus femoris (120) NT NT     Special test Right  Left    SLR test < 60 degrees Positive Negative   SLR test > 60 degrees n/a Negative   Sitting slump test Negative Negative   Piriformis test Positive Negative   SUZETTE test Positive Negative   SI forward bend Positive Negative   SI distraction Negative Negative   SI compression Negative Negative     Gait assessment:   Step length: equal   Step width: WNL   Krupa: WNL    Antalgic gait: no  Assistive device: none    Other test/information: noted to have marked genu recurvatum bilaterally    Palpation: tender to palpation over PSIS, gluteus medius, piriformis    Dermatome: sciatic nerve distribution      Limitation/Restriction for FOTO Lumbar Spine Survey    Therapist reviewed FOTO scores for Tone Nagy on 2/23/2022.   FOTO documents entered into [employer] - see Media section.    Intake Score: 34         TREATMENT     Total Treatment time (time-based codes) separate from Evaluation: 7 minutes    Tone received the treatments listed below:  THERAPEUTIC EXERCISES to develop strength, ROM, flexibility and core stabilization for 7 minutes including piriformis stretching, supine unilateral hip flexion w/ theraband, supine hip abduction w/ theraband, and supine sciatic nerve glide    Home Exercises and Patient Education Provided    Education provided:   - evaluation results, plan of care and home exercise program     Written Home Exercises Provided: yes.  Exercises were reviewed and Tone was able to demonstrate them prior to the end of the session.  Tone demonstrated good  understanding of the education provided.     See EMR under Patient Instructions for exercises provided 2/23/2022.    Assessment   Tone is a 31 y.o. female referred to outpatient Physical Therapy with a medical diagnosis of lumbosacral radiculopathy. Pt presents with complaints of onset of right sided low back pain with right lower extremity symptoms 2 years ago after delivering her third child. She has increased pain with standing > 30 minutes and bending forward to put on her socks and shoes. She has pain immediately upon standing that subsides after she moves around. She describes pain in her right buttock that goes down the back of her leg all the way to her foot. She is tender to palpation around PSIS, glute med and piriformis. Her right lower extremity was slightly shorter in supine and  equalized in long sit. She also has notable genu recurvatum in both knees. She seems to have more symptoms of SI joint dysfunction and piriformis involvement versus lumbar spine dysfunction. Patient is overweight and would also benefit from weight management.    Pt prognosis is Good.   Pt will benefit from skilled outpatient Physical Therapy to address the deficits stated above and in the chart below, provide pt/family education, and to maximize pt's level of independence.     Plan of care discussed with patient: Yes  Pt's spiritual, cultural and educational needs considered and patient is agreeable to the plan of care and goals as stated below:     Anticipated Barriers for therapy: none    Medical Necessity is demonstrated by the following  History  Co-morbidities and personal factors that may impact the plan of care Co-morbidities:   gallstones    Personal Factors:   no deficits     low   Examination  Body Structures and Functions, activity limitations and participation restrictions that may impact the plan of care Body Regions:   back  lower extremities    Body Systems:    gross symmetry  ROM  strength  transfers  transitions    Participation Restrictions:   none    Activity limitations:   Learning and applying knowledge  no deficits    General Tasks and Commands  no deficits    Communication  no deficits    Mobility  lifting and carrying objects  walking  standing while doing hair and cooking    Self care  putting on socks and shoes    Domestic Life  cooking  doing house work (cleaning house, washing dishes, laundry)    Interactions/Relationships  no deficits    Life Areas  employment    Community and Social Life  no deficits         low   Clinical Presentation stable and uncomplicated low   Decision Making/ Complexity Score:      Additional Information for [employer]     Dates of Services: 02/28/2022 to 04/22/2022.  Discharge Plan: Patient will be discharged from skilled PT treatment once all  goals have been met, patient has plateaued, or physician/insurance requests discontinuation of care. Pt will be discharged with a home exercise program.   Last Face-to-Face Visit with Prescribing Physician: 02/07/2022  CPT Codes and Number of Units Requested:   00168 [therapeutic exercise]  o Total units: 32  - 4 units/week x 8 weeks   05491 [neuromuscular re-education]  o Total units: 32  - 4 units/week x 8 weeks   13996 [manual therapy]   o Total units: 16  - 2 units/week x 8 weeks   25270 [therapeutic activities]  o Total units: 32  - 4 units/week x 8 weeks   92952 [unattended electrical stimulation]  o Total units: 16  - 2 units/week x 8 weeks    Goals:  SHORT TERM GOALS:  1.  Patient will be independent with home exercise program to facilitate carryover between visits.  2.  Patient will increase right hamstring 90/90 by 10 degrees for improved mobility.  3.  Patient will increase right hip strength to 5/5 to improve ambulation ability and standing tolerance.    LONG TERM GOALS:  1.  Patient will report decrease in pain to 0-1/10 in right side of lower back/buttock to improve quality of life.  2.  Patient will report decrease in radiating occurrences to < 3 times per week to allow patient the ability to perform activities of daily living.  3.  Patient will be able to stand > 2 hours with pain < 1/10 in right lower extremity to be able to work and cook.             Plan   Plan of care Certification: 2/23/2022 to 04/22/2022.    Outpatient Physical Therapy 2 times weekly for 8 weeks to include the following interventions: Electrical Stimulation IFC/premod as needed for pain, Manual Therapy, Moist Heat/ Ice, Neuromuscular Re-ed, Patient Education, Therapeutic Activities and Therapeutic Exercise.     YOLANDA REYES, PT

## 2022-03-02 ENCOUNTER — CLINICAL SUPPORT (OUTPATIENT)
Dept: REHABILITATION | Facility: HOSPITAL | Age: 32
End: 2022-03-02
Attending: PAIN MEDICINE
Payer: MEDICAID

## 2022-03-02 DIAGNOSIS — M53.3 SACROILIAC JOINT DYSFUNCTION OF RIGHT SIDE: ICD-10-CM

## 2022-03-02 DIAGNOSIS — M54.17 LUMBOSACRAL RADICULOPATHY: Primary | ICD-10-CM

## 2022-03-02 DIAGNOSIS — G57.01 PIRIFORMIS SYNDROME, RIGHT: ICD-10-CM

## 2022-03-02 PROCEDURE — 97110 THERAPEUTIC EXERCISES: CPT | Mod: CQ

## 2022-03-02 PROCEDURE — 97112 NEUROMUSCULAR REEDUCATION: CPT | Mod: CQ

## 2022-03-02 NOTE — PROGRESS NOTES
[facility]   Physical Therapy Treatment Note     Name: Tone Nagy  Clinic Number: [id number]    Therapy Diagnosis: No diagnosis found.  Physician: Arabella Blanco MD    Visit Date: 3/2/2022    Physician Orders: PT Eval and Treat   Medical Diagnosis from Referral: lumbosacral radiculopathy  Evaluation Date: 2/23/2022  Authorization Period Expiration: 12/22/2022   Plan of Care Expiration: 04/22/2022  Progress Note Due: 3/23/2022  Visit # / Visits authorized: 2/ unlimited Medicaid [facility]     PTA Visit #: 1/5     Time In: 3:15 pm  Time Out: 3:51 pm  Total Billable Time: 33 minutes    SUBJECTIVE     Pt reports: no new complaints.  She was compliant with home exercise program one time/one day.  Response to previous treatment: no complaint   Functional change: no change    Pain: 4/10  Location: low back and down right leg       OBJECTIVE     Objective Measures updated at progress report unless specified.     Treatment     Case conference with Lilo Elliott PT, for initial PTA visit.     Tone received the treatments listed below:      therapeutic exercises to develop strength, ROM, flexibility and core stabilization for 17 minutes including:  NuStep x 5 minutes   Slant board x 60 seconds   Hamstring stretch at stairs with ankle pumps for nerve glides x 3 x 15 seconds each   Piriformis stretch seated 3 x 30 second hold   Supine sciatic nerve glide 3 x 10 ankle pumps     manual therapy techniques: Myofacial release and Soft tissue Mobilization were applied to the:  Right lower extremity with manual hamstring and piriformis stretching    neuromuscular re-education activities to improve: Coordination and Posture for 16 minutes. The following activities were included:  Pelvic tilts x 20 with 5 second hold   Tilts with hip abduction with blue theraband x 20  Tilts with right hip flexion with blue theraband x 20    therapeutic activities to improve functional performance for 0 minutes,  "including:      supervised modalities after being cleared for contradictions: IFC Electrical Stimulation:  Tone received IFC Electrical Stimulation for pain control applied to the right side of low back. Pt received stimulation at 100 % scan for 0 minutes. Tone tolderated treatment well without any adverse effects.  (not available today)        Patient Education and Home Exercises     Home Exercises Provided and Patient Education Provided     Education provided:   - review of home exercise program     Written Home Exercises Provided: yes. Exercises were reviewed and Tone was able to demonstrate them prior to the end of the session.  Tone demonstrated good  understanding of the education provided. See EMR under Patient Instructions for exercises provided during therapy sessions    ASSESSMENT     Tone has not been consistent with home exercise program. She did well with exercises in clinic with minimal cueing. She reported no increase in pain with exercises but did "feel" it in her low back by end of treatment. IFC machine was unavailable today but plan to use it next visit if it is.     Tone Is progressing towards her goals.   Pt prognosis is Good.     Pt will continue to benefit from skilled outpatient physical therapy to address the deficits listed in the problem list box on initial evaluation, provide pt/family education and to maximize pt's level of independence in the home and community environment.     Pt's spiritual, cultural and educational needs considered and pt agreeable to plan of care and goals.     Anticipated barriers to physical therapy: none    Goals:   SHORT TERM GOALS:  1.  Patient will be independent with home exercise program to facilitate carryover between visits.  2.  Patient will increase right hamstring 90/90 by 10 degrees for improved mobility.  3.  Patient will increase right hip strength to 5/5 to improve ambulation ability and standing tolerance.     LONG TERM " GOALS:  1.  Patient will report decrease in pain to 0-1/10 in right side of lower back/buttock to improve quality of life.  2.  Patient will report decrease in radiating occurrences to < 3 times per week to allow patient the ability to perform activities of daily living.  3.  Patient will be able to stand > 2 hours with pain < 1/10 in right lower extremity to be able to work and cook.          PLAN     Continue Plan of Care for Outpatient Physical Therapy 2 times weekly for 8 weeks to include the following interventions: Electrical Stimulation IFC/premod as needed for pain, Manual Therapy, Moist Heat/ Ice, Neuromuscular Re-ed, Patient Education, Therapeutic Activities and Therapeutic Exercise as noted in evaluation.        Mercy Prather, PTA

## 2022-03-07 ENCOUNTER — OFFICE VISIT (OUTPATIENT)
Dept: PAIN MEDICINE | Facility: CLINIC | Age: 32
End: 2022-03-07
Payer: MEDICAID

## 2022-03-07 VITALS
DIASTOLIC BLOOD PRESSURE: 72 MMHG | BODY MASS INDEX: 53 KG/M2 | HEIGHT: 62 IN | WEIGHT: 288 LBS | SYSTOLIC BLOOD PRESSURE: 128 MMHG | HEART RATE: 71 BPM | RESPIRATION RATE: 20 BRPM

## 2022-03-07 DIAGNOSIS — M54.41 CHRONIC RIGHT-SIDED LOW BACK PAIN WITH RIGHT-SIDED SCIATICA: ICD-10-CM

## 2022-03-07 DIAGNOSIS — M54.17 LUMBOSACRAL RADICULOPATHY: Primary | ICD-10-CM

## 2022-03-07 DIAGNOSIS — M54.30 SCIATICA, UNSPECIFIED LATERALITY: ICD-10-CM

## 2022-03-07 DIAGNOSIS — G89.29 CHRONIC RIGHT-SIDED LOW BACK PAIN WITH RIGHT-SIDED SCIATICA: ICD-10-CM

## 2022-03-07 PROCEDURE — 1159F PR MEDICATION LIST DOCUMENTED IN MEDICAL RECORD: ICD-10-PCS | Mod: CPTII,,, | Performed by: PAIN MEDICINE

## 2022-03-07 PROCEDURE — 3078F PR MOST RECENT DIASTOLIC BLOOD PRESSURE < 80 MM HG: ICD-10-PCS | Mod: CPTII,,, | Performed by: PAIN MEDICINE

## 2022-03-07 PROCEDURE — 3074F PR MOST RECENT SYSTOLIC BLOOD PRESSURE < 130 MM HG: ICD-10-PCS | Mod: CPTII,,, | Performed by: PAIN MEDICINE

## 2022-03-07 PROCEDURE — 99213 OFFICE O/P EST LOW 20 MIN: CPT | Mod: PBBFAC | Performed by: PAIN MEDICINE

## 2022-03-07 PROCEDURE — 3078F DIAST BP <80 MM HG: CPT | Mod: CPTII,,, | Performed by: PAIN MEDICINE

## 2022-03-07 PROCEDURE — 99212 PR OFFICE/OUTPT VISIT, EST, LEVL II, 10-19 MIN: ICD-10-PCS | Mod: S$PBB,,, | Performed by: PAIN MEDICINE

## 2022-03-07 PROCEDURE — 1159F MED LIST DOCD IN RCRD: CPT | Mod: CPTII,,, | Performed by: PAIN MEDICINE

## 2022-03-07 PROCEDURE — 3008F PR BODY MASS INDEX (BMI) DOCUMENTED: ICD-10-PCS | Mod: CPTII,,, | Performed by: PAIN MEDICINE

## 2022-03-07 PROCEDURE — 99212 OFFICE O/P EST SF 10 MIN: CPT | Mod: S$PBB,,, | Performed by: PAIN MEDICINE

## 2022-03-07 PROCEDURE — 3074F SYST BP LT 130 MM HG: CPT | Mod: CPTII,,, | Performed by: PAIN MEDICINE

## 2022-03-07 PROCEDURE — 3008F BODY MASS INDEX DOCD: CPT | Mod: CPTII,,, | Performed by: PAIN MEDICINE

## 2022-03-08 NOTE — PROGRESS NOTES
She Disclaimer: This note has been generated using voice-recognition software. There may be typographical errors that have been missed during proof-reading        Patient ID: Tone Nagy is a 31 y.o. female.      Chief Complaint: No chief complaint on file.      31-year-old female returns for re-evaluation of chronic lower back pain.  She started physical therapy 3 weeks ago with some improvement.  She has about 5-6 weeks remaining.  Gabapentin has provided some relief but she is unable to tolerate ibuprofen secondary to GI discomfort.  She only notes severe pain with prolonged standing.  She turns today for re-evaluation.          Pain Assessment  Pain Assessment: 0-10  Pain Score:   7  Pain Location: Back  Pain Orientation: Lower  Pain Descriptors: Aching, Dull, Constant  Pain Frequency: Continuous  Pain Onset: Unable to tell  Clinical Progression: Not changed  Aggravating Factors: Standing, Straightening, Bending  Pain Intervention(s): Medication (See eMAR), Rest, Heat applied (position change)      A's of Opioid Risk Assessment  Activity:Patient can  perform ADL.   Analgesia:Patients pain is not controlled by current medication.   Adverse Effects: Patient denies constipation or sedation.  Aberrant Behavior:  reviewed with no aberrant drug seeking/taking behavior.      Patient denies any suicidal or homicidal ideations    Physical Therapy/Home Exercise: yes      No image results found.      Review of Systems   Constitutional: Negative.    HENT: Negative.    Eyes: Negative.    Respiratory: Negative.    Cardiovascular: Negative.    Gastrointestinal: Negative.    Endocrine: Negative.    Genitourinary: Negative.    Musculoskeletal: Positive for back pain and gait problem.   Integumentary:  Negative.   Neurological: Positive for weakness (RLE) and numbness (RLE).   Hematological: Negative.    Psychiatric/Behavioral: Negative.              Past Medical History:   Diagnosis Date    Gallstones      Past  Surgical History:   Procedure Laterality Date    LAPAROSCOPIC SURGICAL REMOVAL OF CYST OF OVARY       Social History     Socioeconomic History    Marital status:      Spouse name: Pedro Nagy    Number of children: 3    Highest education level: Associate degree: occupational, technical, or vocational program   Tobacco Use    Smoking status: Current Every Day Smoker    Smokeless tobacco: Never Used   Substance and Sexual Activity    Alcohol use: Never    Drug use: Yes     Types: Marijuana    Sexual activity: Yes     Partners: Male     Birth control/protection: Implant     Family History   Problem Relation Age of Onset    Hypertension Maternal Grandmother     Diabetes Maternal Grandmother     Breast cancer Maternal Grandmother     Hypothyroidism Maternal Grandmother     Kidney disease Maternal Grandfather      Review of patient's allergies indicates:  No Known Allergies  has a current medication list which includes the following prescription(s): gabapentin and ibuprofen.      Objective:  Vitals:    03/07/22 1345   BP: 128/72   Pulse: 71   Resp: 20        Physical Exam  Vitals and nursing note reviewed.   Constitutional:       General: She is not in acute distress.     Appearance: Normal appearance. She is obese. She is not ill-appearing, toxic-appearing or diaphoretic.   HENT:      Head: Normocephalic and atraumatic.      Nose: Nose normal.      Mouth/Throat:      Mouth: Mucous membranes are moist.   Eyes:      Extraocular Movements: Extraocular movements intact.      Pupils: Pupils are equal, round, and reactive to light.   Cardiovascular:      Rate and Rhythm: Normal rate and regular rhythm.      Heart sounds: Normal heart sounds.   Pulmonary:      Effort: Pulmonary effort is normal. No respiratory distress.      Breath sounds: Normal breath sounds. No stridor. No wheezing or rhonchi.   Abdominal:      General: Bowel sounds are normal.      Palpations: Abdomen is soft.   Musculoskeletal:          General: No swelling, tenderness or deformity.      Cervical back: Normal and normal range of motion. No spasms or tenderness. No pain with movement. Normal range of motion.      Thoracic back: Normal.      Lumbar back: Bony tenderness present. No spasms or tenderness. Decreased range of motion. Positive right straight leg raise test. Negative left straight leg raise test. No scoliosis.      Right lower leg: No edema.      Left lower leg: No edema.      Comments: Lumbar pain with flexion, extension and lateral rotation.   Skin:     General: Skin is warm.   Neurological:      General: No focal deficit present.      Mental Status: She is alert and oriented to person, place, and time. Mental status is at baseline.      Cranial Nerves: Cranial nerves are intact. No cranial nerve deficit.      Sensory: Sensation is intact. No sensory deficit.      Motor: No weakness.      Coordination: Coordination normal.      Gait: Gait normal.      Deep Tendon Reflexes: Reflexes are normal and symmetric.   Psychiatric:         Mood and Affect: Mood normal.         Behavior: Behavior normal.           Assessment:      1. Lumbosacral radiculopathy    2. Sciatica, unspecified laterality    3. Chronic right-sided low back pain with right-sided sciatica          Plan:  1. reviewed  2. Continue physical therapy for chronic lower back pain as previously prescribed   3. Continue gabapentin for lumbar radicular pain  4. Return in 6 weeks for re-evaluation treatment options  5. Consider MRI of the lumbar spine after completion of physical therapy and if indicated     report:  Reviewed and consistent with medication use as prescribed.      The total time spent for evaluation and management on 03/07/2022 including reviewing separately obtained history, performing a medically appropriate exam and evaluation, documenting clinical information in the health record, independently interpreting results and communicating them to the  patient/family/caregiver, and ordering medications/tests/procedures was between 15-29 minutes.    The above plan and management options were discussed at length with patient. Patient is in agreement with the above and verbalized understanding. It will be communicated with the referring physician via electronic record, fax, or mail.

## 2022-03-21 ENCOUNTER — CLINICAL SUPPORT (OUTPATIENT)
Dept: REHABILITATION | Facility: HOSPITAL | Age: 32
End: 2022-03-21
Attending: PAIN MEDICINE
Payer: MEDICAID

## 2022-03-21 DIAGNOSIS — G57.01 PIRIFORMIS SYNDROME, RIGHT: ICD-10-CM

## 2022-03-21 DIAGNOSIS — M53.3 SACROILIAC JOINT DYSFUNCTION OF RIGHT SIDE: ICD-10-CM

## 2022-03-21 DIAGNOSIS — M54.17 LUMBOSACRAL RADICULOPATHY: Primary | ICD-10-CM

## 2022-03-21 PROCEDURE — 97112 NEUROMUSCULAR REEDUCATION: CPT | Mod: CQ

## 2022-03-21 PROCEDURE — 97110 THERAPEUTIC EXERCISES: CPT | Mod: CQ

## 2022-03-21 PROCEDURE — 97014 ELECTRIC STIMULATION THERAPY: CPT | Mod: CQ

## 2022-03-21 NOTE — PROGRESS NOTES
[facility]   Physical Therapy Treatment Note     Name: Tone Nagy  Clinic Number: [id number]    Therapy Diagnosis:   Encounter Diagnoses   Name Primary?    Lumbosacral radiculopathy Yes    Piriformis syndrome, right     Sacroiliac joint dysfunction of right side      Physician: Arabella Blanco MD    Visit Date: 3/21/2022    Physician Orders: PT Eval and Treat   Medical Diagnosis from Referral: lumbosacral radiculopathy  Evaluation Date: 2/23/2022  Authorization Period Expiration: 12/22/2022   Plan of Care Expiration: 04/22/2022  Progress Note Due: 3/23/2022  Visit # / Visits authorized: 3/ unlimited Medicaid [facility]     PTA Visit #: 2/5     Time In: 3:22 pm  Time Out: 4:05 pm  Total Billable Time: 43 minutes    SUBJECTIVE     Pt reports:  Hurting a little more today.  She was compliant with home exercise program one time/one day.  Response to previous treatment: she felt pretty good  Functional change: no change    Pain: 6/10  Location: low back and down right leg       OBJECTIVE     Objective Measures updated at progress report unless specified.     Treatment     Tone received the treatments listed below:      therapeutic exercises to develop strength, ROM, flexibility and core stabilization for 15 minutes including:  Bike x 5 minutes   Slant board 2 x 60 seconds   Hamstring stretch at stairs with ankle pumps for nerve glides x 3 x 15 seconds each   Piriformis stretch seated 3 x 30 second hold   Supine sciatic nerve glide --     manual therapy techniques: Myofacial release and Soft tissue Mobilization were applied to the:  Right lower extremity with manual hamstring and piriformis stretching    neuromuscular re-education activities to improve: Coordination and Posture for 20 minutes. The following activities were included:  Pelvic tilts x 20 with 5 second hold   Tilts with hip adduction x 20 with 3 second hold   Tilts with hip abduction with blue theraband x 20  Tilts with  right hip flexion with blue theraband x 20  Bridges x 20    therapeutic activities to improve functional performance for 0 minutes, including:    supervised modalities after being cleared for contradictions: IFC Electrical Stimulation:  Tone received IFC Electrical Stimulation for pain control applied to the right side of low back. Pt received stimulation at 100 % scan for 10 minutes. Tone tolderated treatment well without any adverse effects.  (not available today)        Patient Education and Home Exercises     Home Exercises Provided and Patient Education Provided     Education provided:   - review of home exercise program     Written Home Exercises Provided: yes. Exercises were reviewed and Tone was able to demonstrate them prior to the end of the session.  Tone demonstrated good  understanding of the education provided. See EMR under Patient Instructions for exercises provided during therapy sessions    ASSESSMENT     Tone did well with exercises in clinic. She was able to add bridges and hip adduction without complaint. She ended treatment with premodulated electrical stimulation to mid-low back x 10 minutes while sitting in chair. She reports decrease in pain after treatment from 6/10 to 3/10.     Tone Is progressing towards her goals.   Pt prognosis is Good.     Pt will continue to benefit from skilled outpatient physical therapy to address the deficits listed in the problem list box on initial evaluation, provide pt/family education and to maximize pt's level of independence in the home and community environment.     Pt's spiritual, cultural and educational needs considered and pt agreeable to plan of care and goals.     Anticipated barriers to physical therapy: none    Goals:   SHORT TERM GOALS:  1.  Patient will be independent with home exercise program to facilitate carryover between visits.  2.  Patient will increase right hamstring 90/90 by 10 degrees for improved mobility.  3.   Patient will increase right hip strength to 5/5 to improve ambulation ability and standing tolerance.     LONG TERM GOALS:  1.  Patient will report decrease in pain to 0-1/10 in right side of lower back/buttock to improve quality of life.  2.  Patient will report decrease in radiating occurrences to < 3 times per week to allow patient the ability to perform activities of daily living.  3.  Patient will be able to stand > 2 hours with pain < 1/10 in right lower extremity to be able to work and cook.          PLAN     Continue Plan of Care for Outpatient Physical Therapy 2 times weekly for 8 weeks to include the following interventions: Electrical Stimulation IFC/premod as needed for pain, Manual Therapy, Moist Heat/ Ice, Neuromuscular Re-ed, Patient Education, Therapeutic Activities and Therapeutic Exercise as noted in evaluation.        Mercy Prather, PTA

## 2022-05-26 ENCOUNTER — OFFICE VISIT (OUTPATIENT)
Dept: FAMILY MEDICINE | Facility: CLINIC | Age: 32
End: 2022-05-26
Payer: MEDICAID

## 2022-05-26 VITALS
TEMPERATURE: 98 F | BODY MASS INDEX: 51.91 KG/M2 | HEART RATE: 67 BPM | RESPIRATION RATE: 20 BRPM | SYSTOLIC BLOOD PRESSURE: 136 MMHG | DIASTOLIC BLOOD PRESSURE: 81 MMHG | OXYGEN SATURATION: 100 % | HEIGHT: 63 IN | WEIGHT: 293 LBS

## 2022-05-26 DIAGNOSIS — M54.30 SCIATICA, UNSPECIFIED LATERALITY: ICD-10-CM

## 2022-05-26 DIAGNOSIS — M54.9 BACK PAIN, UNSPECIFIED BACK LOCATION, UNSPECIFIED BACK PAIN LATERALITY, UNSPECIFIED CHRONICITY: Primary | ICD-10-CM

## 2022-05-26 PROCEDURE — 3075F SYST BP GE 130 - 139MM HG: CPT | Mod: CPTII,,, | Performed by: NURSE PRACTITIONER

## 2022-05-26 PROCEDURE — 3079F PR MOST RECENT DIASTOLIC BLOOD PRESSURE 80-89 MM HG: ICD-10-PCS | Mod: CPTII,,, | Performed by: NURSE PRACTITIONER

## 2022-05-26 PROCEDURE — 96372 THER/PROPH/DIAG INJ SC/IM: CPT | Mod: ,,, | Performed by: NURSE PRACTITIONER

## 2022-05-26 PROCEDURE — 96372 PR INJECTION,THERAP/PROPH/DIAG2ST, IM OR SUBCUT: ICD-10-PCS | Mod: ,,, | Performed by: NURSE PRACTITIONER

## 2022-05-26 PROCEDURE — 3079F DIAST BP 80-89 MM HG: CPT | Mod: CPTII,,, | Performed by: NURSE PRACTITIONER

## 2022-05-26 PROCEDURE — 3008F PR BODY MASS INDEX (BMI) DOCUMENTED: ICD-10-PCS | Mod: CPTII,,, | Performed by: NURSE PRACTITIONER

## 2022-05-26 PROCEDURE — 99213 OFFICE O/P EST LOW 20 MIN: CPT | Mod: 25,,, | Performed by: NURSE PRACTITIONER

## 2022-05-26 PROCEDURE — 1159F MED LIST DOCD IN RCRD: CPT | Mod: CPTII,,, | Performed by: NURSE PRACTITIONER

## 2022-05-26 PROCEDURE — 1159F PR MEDICATION LIST DOCUMENTED IN MEDICAL RECORD: ICD-10-PCS | Mod: CPTII,,, | Performed by: NURSE PRACTITIONER

## 2022-05-26 PROCEDURE — 99213 PR OFFICE/OUTPT VISIT, EST, LEVL III, 20-29 MIN: ICD-10-PCS | Mod: 25,,, | Performed by: NURSE PRACTITIONER

## 2022-05-26 PROCEDURE — 3075F PR MOST RECENT SYSTOLIC BLOOD PRESS GE 130-139MM HG: ICD-10-PCS | Mod: CPTII,,, | Performed by: NURSE PRACTITIONER

## 2022-05-26 PROCEDURE — 1160F RVW MEDS BY RX/DR IN RCRD: CPT | Mod: CPTII,,, | Performed by: NURSE PRACTITIONER

## 2022-05-26 PROCEDURE — 1160F PR REVIEW ALL MEDS BY PRESCRIBER/CLIN PHARMACIST DOCUMENTED: ICD-10-PCS | Mod: CPTII,,, | Performed by: NURSE PRACTITIONER

## 2022-05-26 PROCEDURE — 3008F BODY MASS INDEX DOCD: CPT | Mod: CPTII,,, | Performed by: NURSE PRACTITIONER

## 2022-05-26 RX ORDER — DEXAMETHASONE SODIUM PHOSPHATE 4 MG/ML
4 INJECTION, SOLUTION INTRA-ARTICULAR; INTRALESIONAL; INTRAMUSCULAR; INTRAVENOUS; SOFT TISSUE
Status: COMPLETED | OUTPATIENT
Start: 2022-05-26 | End: 2022-05-26

## 2022-05-26 RX ORDER — CYCLOBENZAPRINE HCL 10 MG
10 TABLET ORAL 3 TIMES DAILY PRN
Qty: 60 TABLET | Refills: 0 | Status: SHIPPED | OUTPATIENT
Start: 2022-05-26 | End: 2022-06-05

## 2022-05-26 RX ORDER — KETOROLAC TROMETHAMINE 30 MG/ML
60 INJECTION, SOLUTION INTRAMUSCULAR; INTRAVENOUS
Status: COMPLETED | OUTPATIENT
Start: 2022-05-26 | End: 2022-05-26

## 2022-05-26 RX ADMIN — DEXAMETHASONE SODIUM PHOSPHATE 4 MG: 4 INJECTION, SOLUTION INTRA-ARTICULAR; INTRALESIONAL; INTRAMUSCULAR; INTRAVENOUS; SOFT TISSUE at 04:05

## 2022-05-26 RX ADMIN — KETOROLAC TROMETHAMINE 60 MG: 30 INJECTION, SOLUTION INTRAMUSCULAR; INTRAVENOUS at 04:05

## 2022-05-26 NOTE — PROGRESS NOTES
"Subjective:       Patient ID: Tone Nagy is a 31 y.o. female.    Chief Complaint: Back Pain and Leg Pain (Right leg pain)    Presents to clinic with c/o lower back pain and right leg pain. She is seeing Dr. Blanco at [facility] in June. Her back pain is chronic. No leg weakness. No incontinence,.     Review of Systems   Constitutional: Negative.    Respiratory: Negative.    Cardiovascular: Negative.    Genitourinary: Negative.    Musculoskeletal: Positive for back pain. Negative for falls and joint pain.          Reviewed family, medical, surgical, and social history.    Objective:      /81   Pulse 67   Temp 98.4 °F (36.9 °C)   Resp 20   Ht 5' 3" (1.6 m)   Wt 134.3 kg (296 lb)   SpO2 100%   BMI 52.43 kg/m²   Physical Exam  Vitals and nursing note reviewed.   Constitutional:       General: She is not in acute distress.     Appearance: Normal appearance. She is not ill-appearing or diaphoretic.   HENT:      Head: Normocephalic.      Mouth/Throat:      Mouth: Mucous membranes are moist.   Cardiovascular:      Rate and Rhythm: Normal rate and regular rhythm.      Pulses: Normal pulses.      Heart sounds: Normal heart sounds.   Pulmonary:      Effort: Pulmonary effort is normal.      Breath sounds: Normal breath sounds.   Musculoskeletal:      Cervical back: Normal range of motion and neck supple.      Lumbar back: Spasms present. No swelling, edema, deformity, signs of trauma, lacerations, tenderness or bony tenderness. Decreased range of motion. Positive right straight leg raise test. Negative left straight leg raise test. No scoliosis.   Skin:     General: Skin is warm and dry.      Capillary Refill: Capillary refill takes less than 2 seconds.   Neurological:      General: No focal deficit present.      Mental Status: She is alert and oriented to person, place, and time.   Psychiatric:         Mood and Affect: Mood normal.         Behavior: Behavior normal.         Thought Content: Thought " content normal.         Judgment: Judgment normal.            No visits with results within 1 Day(s) from this visit.   Latest known visit with results is:   Office Visit on 02/07/2022   Component Date Value Ref Range Status    POC Amphetamines 02/07/2022 Presumptive Positive (A) Negative, Inconclusive Final    POC Barbiturates 02/07/2022 Negative  Negative, Inconclusive Final    POC Benzodiazepines 02/07/2022 Negative  Negative, Inconclusive Final    POC Cocaine 02/07/2022 Negative  Negative, Inconclusive Final    POC THC 02/07/2022 Presumptive Positive (A) Negative, Inconclusive Final    POC Methadone 02/07/2022 Negative  Negative, Inconclusive Final    POC Methamphetamine 02/07/2022 Negative  Negative, Inconclusive Final    POC Opiates 02/07/2022 Negative  Negative, Inconclusive Final    POC Oxycodone 02/07/2022 Negative  Negative, Inconclusive Final    POC Phencyclidine 02/07/2022 Negative  Negative, Inconclusive Final    POC Methylenedioxymethamphetamine * 02/07/2022 Negative  Negative, Inconclusive Final    POC Tricyclic Antidepressants 02/07/2022 Negative  Negative, Inconclusive Final    POC Buprenorphine 02/07/2022 Negative   Final     Acceptable 02/07/2022 Yes   Final    POC Temperature (Urine) 02/07/2022 94   Final    pH, UA 02/07/2022 6.0  5.0, 5.5, 6.0, 6.5, 7.0, 7.5, 8.0 pH Units Final    Specific Gravity, UA 02/07/2022 >=1.030 (A) <=1.005, 1.010, 1.015, 1.020, 1.025, 1.030 Final    Creatinine, Urine 02/07/2022 366 (A) 28 - 219 mg/dL Final    6-Acetylmorphine 02/07/2022 Negative  10 ng/mL Final    7-Aminoclonazepam 02/07/2022 Negative  Negative 25 ng/mL Final    a-Hydroxyalprazolam 02/07/2022 Negative  25 ng/mL Final    Acetyl Fentanyl 02/07/2022 Negative  2.5 ng/mL Final    Acetyl Norfentanyl Oxalate 02/07/2022 Negative  5 ng/mL Final    Benzoylecgonine 02/07/2022 Negative  100 ng/mL Final    Buprenorphine 02/07/2022 Negative  25 ng/mL Final    Codeine  02/07/2022 Negative  25 ng/mL Final    EDDP 02/07/2022 Negative  25 ng/mL Final    Fentanyl 02/07/2022 Negative  2.5 ng/mL Final    Hydrocodone 02/07/2022 Negative  25 ng/mL Final    Hydromorphone 02/07/2022 Negative  25 ng/mL Final    Lorazepam 02/07/2022 Negative  25 ng/mL Final    Morphine 02/07/2022 Negative  25 ng/mL Final    Norbuprenorphine 02/07/2022 Negative  25 ng/mL Final    Nordiazepam 02/07/2022 Negative  25 ng/mL Final    Norfentanyl Oxalate 02/07/2022 Negative  5 ng/mL Final    Norhydrocodone 02/07/2022 Negative  50 ng/mL Final    Noroxycodone HCL 02/07/2022 Negative  50 ng/mL Final    Oxazepam 02/07/2022 Negative  25 ng/mL Final    Oxymorphone 02/07/2022 Negative  25 ng/mL Final    Tapentadol 02/07/2022 Negative  25 ng/mL Final    Temazepam 02/07/2022 Negative  25 ng/mL Final    THC-COOH 02/07/2022 >250.0 (A) <25.0 25 ng/mL Final    Tramadol 02/07/2022 Negative  100 ng/mL Final    Amphetamine, Urine 02/07/2022 Negative  Negative 100 ng/mL Final    Methamphetamines, Urine 02/07/2022 Negative  Negative 100 ng/mL Final    Methadone, Urine 02/07/2022 Negative  Negative 25 ng/mL Final    Oxycodone, Urine 02/07/2022 Negative  Negative 25 ng/mL Final      Assessment:       1. Back pain, unspecified back location, unspecified back pain laterality, unspecified chronicity    2. Sciatica, unspecified laterality        Plan:       Back pain, unspecified back location, unspecified back pain laterality, unspecified chronicity  -     ketorolac injection 60 mg  -     dexamethasone injection 4 mg  -     cyclobenzaprine (FLEXERIL) 10 MG tablet; Take 1 tablet (10 mg total) by mouth 3 (three) times daily as needed for Muscle spasms.  Dispense: 60 tablet; Refill: 0    Sciatica, unspecified laterality  -     ketorolac injection 60 mg  -     dexamethasone injection 4 mg  -     cyclobenzaprine (FLEXERIL) 10 MG tablet; Take 1 tablet (10 mg total) by mouth 3 (three) times daily as needed for Muscle  spasms.  Dispense: 60 tablet; Refill: 0    F/U with Dr. Blanco  RTC PRN          Risks, benefits, and side effects were discussed with the patient. All questions were answered to the fullest satisfaction of the patient, and pt verbalized understanding and agreement to treatment plan. Pt was to call with any new or worsening symptoms, or present to the ER.

## 2022-06-02 ENCOUNTER — HOSPITAL ENCOUNTER (EMERGENCY)
Facility: HOSPITAL | Age: 32
Discharge: HOME | End: 2022-06-02
Payer: MEDICAID

## 2022-06-02 VITALS
WEIGHT: 293 LBS | DIASTOLIC BLOOD PRESSURE: 97 MMHG | BODY MASS INDEX: 51.91 KG/M2 | TEMPERATURE: 99 F | OXYGEN SATURATION: 96 % | RESPIRATION RATE: 18 BRPM | SYSTOLIC BLOOD PRESSURE: 156 MMHG | HEART RATE: 111 BPM | HEIGHT: 63 IN

## 2022-06-02 DIAGNOSIS — M54.30 SCIATICA, UNSPECIFIED LATERALITY: Primary | ICD-10-CM

## 2022-06-02 PROCEDURE — 63600175 PHARM REV CODE 636 W HCPCS: Performed by: NURSE PRACTITIONER

## 2022-06-02 PROCEDURE — 99283 PR EMERGENCY DEPT VISIT,LEVEL III: ICD-10-PCS | Mod: ,,, | Performed by: NURSE PRACTITIONER

## 2022-06-02 PROCEDURE — 96372 THER/PROPH/DIAG INJ SC/IM: CPT

## 2022-06-02 PROCEDURE — 99283 EMERGENCY DEPT VISIT LOW MDM: CPT | Mod: ,,, | Performed by: NURSE PRACTITIONER

## 2022-06-02 PROCEDURE — 99284 EMERGENCY DEPT VISIT MOD MDM: CPT

## 2022-06-02 RX ORDER — DEXAMETHASONE SODIUM PHOSPHATE 4 MG/ML
8 INJECTION, SOLUTION INTRA-ARTICULAR; INTRALESIONAL; INTRAMUSCULAR; INTRAVENOUS; SOFT TISSUE
Status: COMPLETED | OUTPATIENT
Start: 2022-06-02 | End: 2022-06-02

## 2022-06-02 RX ORDER — PREDNISONE 20 MG/1
20 TABLET ORAL 2 TIMES DAILY
Qty: 10 TABLET | Refills: 0 | Status: SHIPPED | OUTPATIENT
Start: 2022-06-02 | End: 2022-06-07

## 2022-06-02 RX ADMIN — DEXAMETHASONE SODIUM PHOSPHATE 8 MG: 4 INJECTION, SOLUTION INTRAMUSCULAR; INTRAVENOUS at 08:06

## 2022-06-02 NOTE — DISCHARGE INSTRUCTIONS
Take prednisone as directed. Follow up with your primary care provider in 2 days. Return to the emergency department for any increase in symptoms or for any other new or worrisome symptoms.

## 2022-06-02 NOTE — Clinical Note
"Tone Maldonado" Lilo was seen and treated in our emergency department on 6/2/2022.  She may return to work on 06/03/2022.       If you have any questions or concerns, please don't hesitate to call.       RN    "

## 2022-06-02 NOTE — ED PROVIDER NOTES
Encounter Date: 6/2/2022       History     Chief Complaint   Patient presents with    Back Pain     31 year old female presents to the emergency department to be evaluated for pain in her lower back that radiates down her right leg. Denies any injury. This is a chronic problem for her. She has been evaluated by pain treatment and is scheduled for an MRI next week.     The history is provided by the patient.   Back Pain   This is a chronic problem. The pain is present in the lumbar spine. Pertinent negatives include no chest pain, no fever, no numbness, no weight loss, no headaches, no abdominal pain, no abdominal swelling, no bowel incontinence, no perianal numbness, no bladder incontinence, no dysuria, no pelvic pain, no leg pain, no paresthesias, no paresis, no tingling and no weakness.     Review of patient's allergies indicates:  No Known Allergies  Past Medical History:   Diagnosis Date    Gallstones      Past Surgical History:   Procedure Laterality Date    LAPAROSCOPIC SURGICAL REMOVAL OF CYST OF OVARY       Family History   Problem Relation Age of Onset    Hypertension Maternal Grandmother     Diabetes Maternal Grandmother     Breast cancer Maternal Grandmother     Hypothyroidism Maternal Grandmother     Kidney disease Maternal Grandfather      Social History     Tobacco Use    Smoking status: Current Every Day Smoker    Smokeless tobacco: Never Used   Substance Use Topics    Alcohol use: Never    Drug use: Yes     Types: Marijuana     Review of Systems   Constitutional: Negative for fever and weight loss.   Cardiovascular: Negative for chest pain.   Gastrointestinal: Negative for abdominal pain and bowel incontinence.   Genitourinary: Negative for bladder incontinence, dysuria and pelvic pain.   Musculoskeletal: Positive for back pain.   Neurological: Negative for tingling, weakness, numbness, headaches and paresthesias.   All other systems reviewed and are negative.      Physical Exam      Initial Vitals [06/02/22 0808]   BP Pulse Resp Temp SpO2   (!) 156/97 (!) 111 18 98.5 °F (36.9 °C) 96 %      MAP       --         Physical Exam    Vitals reviewed.  Constitutional: She appears well-developed and well-nourished.   Eyes: Pupils are equal, round, and reactive to light.   Neck: Neck supple.   Cardiovascular: Normal rate and regular rhythm.   Pulmonary/Chest: Breath sounds normal.   Abdominal: Abdomen is soft. Bowel sounds are normal.   Musculoskeletal:      Cervical back: Normal and neck supple.      Thoracic back: Normal.      Lumbar back: Tenderness and bony tenderness present. No swelling, edema, deformity, signs of trauma, lacerations or spasms. Normal range of motion. Negative right straight leg raise test and negative left straight leg raise test. No scoliosis.     Neurological: She is alert and oriented to person, place, and time. She has normal strength. GCS score is 15. GCS eye subscore is 4. GCS verbal subscore is 5. GCS motor subscore is 6.   Skin: Skin is warm and dry. Capillary refill takes less than 2 seconds.   Psychiatric: She has a normal mood and affect.         Medical Screening Exam   See Full Note    ED Course   Procedures  Labs Reviewed - No data to display       Imaging Results    None          Medications   dexamethasone injection 8 mg (8 mg Intramuscular Given 6/2/22 0837)                       Clinical Impression:   Final diagnoses:  [M54.30] Sciatica, unspecified laterality (Primary)          ED Disposition Condition    Discharge Stable        ED Prescriptions     Medication Sig Dispense Start Date End Date Auth. Provider    predniSONE (DELTASONE) 20 MG tablet Take 1 tablet (20 mg total) by mouth 2 (two) times daily. for 5 days 10 tablet 6/2/2022 6/7/2022 PRIYANKA Florence        Follow-up Information    None          PRIYANKA Florence  06/02/22 0908

## 2022-06-13 ENCOUNTER — TELEPHONE (OUTPATIENT)
Dept: PAIN MEDICINE | Facility: CLINIC | Age: 32
End: 2022-06-13
Payer: MEDICAID

## 2022-06-13 PROBLEM — G57.01 PIRIFORMIS SYNDROME, RIGHT: Chronic | Status: ACTIVE | Noted: 2022-02-23

## 2022-06-13 RX ORDER — GABAPENTIN 300 MG/1
300 CAPSULE ORAL 3 TIMES DAILY
Qty: 90 CAPSULE | Refills: 11 | Status: CANCELLED | OUTPATIENT
Start: 2022-06-13 | End: 2023-06-13

## 2022-06-13 NOTE — PROGRESS NOTES
Subjective:         Patient ID: Tone Nagy is a 31 y.o. female.    Chief Complaint: Back Pain and Leg Pain      Pain  This is a chronic problem. The current episode started more than 1 year ago. The problem occurs daily. The problem has been waxing and waning. Associated symptoms include arthralgias. Pertinent negatives include no change in bowel habit, chest pain, chills, coughing, diaphoresis, fever, neck pain, rash, sore throat, vertigo or vomiting.     Review of Systems   Constitutional: Negative for activity change, appetite change, chills, diaphoresis, fever and unexpected weight change.   HENT: Negative for drooling, ear discharge, ear pain, facial swelling, nosebleeds, sore throat, trouble swallowing, voice change and goiter.    Eyes: Negative for photophobia, pain, discharge, redness and visual disturbance.   Respiratory: Negative for apnea, cough, choking, chest tightness, shortness of breath, wheezing and stridor.    Cardiovascular: Negative for chest pain, palpitations and leg swelling.   Gastrointestinal: Negative for abdominal distention, change in bowel habit, diarrhea, rectal pain, vomiting, fecal incontinence and change in bowel habit.   Endocrine: Negative for cold intolerance, heat intolerance, polydipsia, polyphagia and polyuria.   Genitourinary: Negative for bladder incontinence, dysuria, flank pain, frequency and hot flashes.   Musculoskeletal: Positive for arthralgias, back pain and leg pain. Negative for neck pain.   Integumentary:  Negative for color change, pallor and rash.   Allergic/Immunologic: Negative for immunocompromised state.   Neurological: Negative for dizziness, vertigo, seizures, syncope, facial asymmetry, speech difficulty, light-headedness, disturbances in coordination, memory loss and coordination difficulties.   Hematological: Negative for adenopathy. Does not bruise/bleed easily.   Psychiatric/Behavioral: Negative for agitation, behavioral problems, confusion,  "decreased concentration, dysphoric mood, hallucinations, self-injury and suicidal ideas. The patient is not nervous/anxious and is not hyperactive.             Objective:  Vitals:    06/14/22 1010   BP: (!) 163/67   Pulse: 83   Weight: (!) 137 kg (302 lb)   Height: 5' 2" (1.575 m)   PainSc: 10-Worst pain ever         Physical Exam  Vitals and nursing note reviewed. Exam conducted with a chaperone present.   Constitutional:       General: She is awake. She is not in acute distress.     Appearance: Normal appearance. She is not ill-appearing, toxic-appearing or diaphoretic.   HENT:      Head: Normocephalic and atraumatic.      Nose: Nose normal.      Mouth/Throat:      Mouth: Mucous membranes are moist.      Pharynx: Oropharynx is clear.   Eyes:      Conjunctiva/sclera: Conjunctivae normal.      Pupils: Pupils are equal, round, and reactive to light.   Cardiovascular:      Rate and Rhythm: Normal rate.   Pulmonary:      Effort: Pulmonary effort is normal. No respiratory distress.   Abdominal:      Palpations: Abdomen is soft.      Tenderness: There is no guarding.   Musculoskeletal:         General: Normal range of motion.      Cervical back: Normal range of motion and neck supple. No rigidity.   Skin:     General: Skin is warm and dry.      Coloration: Skin is not jaundiced or pale.   Neurological:      General: No focal deficit present.      Mental Status: She is alert and oriented to person, place, and time. Mental status is at baseline.      Cranial Nerves: Cranial nerves are intact. No cranial nerve deficit (II-XII).   Psychiatric:         Mood and Affect: Mood normal.         Behavior: Behavior normal. Behavior is cooperative.         Thought Content: Thought content normal.           No image results found.       Office Visit on 02/07/2022   Component Date Value Ref Range Status    POC Amphetamines 02/07/2022 Presumptive Positive (A) Negative, Inconclusive Final    POC Barbiturates 02/07/2022 Negative  " Negative, Inconclusive Final    POC Benzodiazepines 02/07/2022 Negative  Negative, Inconclusive Final    POC Cocaine 02/07/2022 Negative  Negative, Inconclusive Final    POC THC 02/07/2022 Presumptive Positive (A) Negative, Inconclusive Final    POC Methadone 02/07/2022 Negative  Negative, Inconclusive Final    POC Methamphetamine 02/07/2022 Negative  Negative, Inconclusive Final    POC Opiates 02/07/2022 Negative  Negative, Inconclusive Final    POC Oxycodone 02/07/2022 Negative  Negative, Inconclusive Final    POC Phencyclidine 02/07/2022 Negative  Negative, Inconclusive Final    POC Methylenedioxymethamphetamine * 02/07/2022 Negative  Negative, Inconclusive Final    POC Tricyclic Antidepressants 02/07/2022 Negative  Negative, Inconclusive Final    POC Buprenorphine 02/07/2022 Negative   Final     Acceptable 02/07/2022 Yes   Final    POC Temperature (Urine) 02/07/2022 94   Final    pH, UA 02/07/2022 6.0  5.0, 5.5, 6.0, 6.5, 7.0, 7.5, 8.0 pH Units Final    Specific Gravity, UA 02/07/2022 >=1.030 (A) <=1.005, 1.010, 1.015, 1.020, 1.025, 1.030 Final    Creatinine, Urine 02/07/2022 366 (A) 28 - 219 mg/dL Final    6-Acetylmorphine 02/07/2022 Negative  10 ng/mL Final    7-Aminoclonazepam 02/07/2022 Negative  Negative 25 ng/mL Final    a-Hydroxyalprazolam 02/07/2022 Negative  25 ng/mL Final    Acetyl Fentanyl 02/07/2022 Negative  2.5 ng/mL Final    Acetyl Norfentanyl Oxalate 02/07/2022 Negative  5 ng/mL Final    Benzoylecgonine 02/07/2022 Negative  100 ng/mL Final    Buprenorphine 02/07/2022 Negative  25 ng/mL Final    Codeine 02/07/2022 Negative  25 ng/mL Final    EDDP 02/07/2022 Negative  25 ng/mL Final    Fentanyl 02/07/2022 Negative  2.5 ng/mL Final    Hydrocodone 02/07/2022 Negative  25 ng/mL Final    Hydromorphone 02/07/2022 Negative  25 ng/mL Final    Lorazepam 02/07/2022 Negative  25 ng/mL Final    Morphine 02/07/2022 Negative  25 ng/mL Final    Norbuprenorphine  02/07/2022 Negative  25 ng/mL Final    Nordiazepam 02/07/2022 Negative  25 ng/mL Final    Norfentanyl Oxalate 02/07/2022 Negative  5 ng/mL Final    Norhydrocodone 02/07/2022 Negative  50 ng/mL Final    Noroxycodone HCL 02/07/2022 Negative  50 ng/mL Final    Oxazepam 02/07/2022 Negative  25 ng/mL Final    Oxymorphone 02/07/2022 Negative  25 ng/mL Final    Tapentadol 02/07/2022 Negative  25 ng/mL Final    Temazepam 02/07/2022 Negative  25 ng/mL Final    THC-COOH 02/07/2022 >250.0 (A) <25.0 25 ng/mL Final    Tramadol 02/07/2022 Negative  100 ng/mL Final    Amphetamine, Urine 02/07/2022 Negative  Negative 100 ng/mL Final    Methamphetamines, Urine 02/07/2022 Negative  Negative 100 ng/mL Final    Methadone, Urine 02/07/2022 Negative  Negative 25 ng/mL Final    Oxycodone, Urine 02/07/2022 Negative  Negative 25 ng/mL Final   Office Visit on 12/28/2021   Component Date Value Ref Range Status    Color, UA 12/28/2021 Yellow   Final    Spec Grav UA 12/28/2021 1.030   Final    pH, UA 12/28/2021 5.5   Final    WBC, UA 12/28/2021 Negative   Final    Nitrite, UA 12/28/2021 Negative   Final    Protein, POC 12/28/2021 30mg   Final    Glucose, UA 12/28/2021 Negative   Final    Ketones, UA 12/28/2021 15mg   Final    Bilirubin, POC 12/28/2021 Small   Final    Urobilinogen, UA 12/28/2021 0.2   Final    Blood, UA 12/28/2021 Negative   Final    TSH 12/28/2021 2.300  0.358 - 3.740 uIU/mL Final    Free T4 12/28/2021 1.07  0.76 - 1.46 ng/dL Final         Orders Placed This Encounter   Procedures    X-Ray Hips Bilateral 2 View Inc AP Pelvis     Standing Status:   Future     Standing Expiration Date:   6/14/2023     Order Specific Question:   Does the patient have a splint or a brace?     Answer:   No     Order Specific Question:   Does the patient have a cast?     Answer:   No     Order Specific Question:   Is the patient pregnant?     Answer:   No     Order Specific Question:   May the Radiologist modify the order  per protocol to meet the clinical needs of the patient?     Answer:   Yes     Order Specific Question:   Release to patient     Answer:   Immediate    X-Ray Lumbar Complete Including Flex And Ext     Standing Status:   Future     Standing Expiration Date:   6/14/2023     Order Specific Question:   Is the patient pregnant?     Answer:   No     Order Specific Question:   May the Radiologist modify the order per protocol to meet the clinical needs of the patient?     Answer:   Yes     Order Specific Question:   Does the patient have a neck collar or brace on?     Answer:   No    Ambulatory referral/consult to Physical/Occupational Therapy     Standing Status:   Future     Standing Expiration Date:   7/14/2023     Referral Priority:   Routine     Referral Type:   Physical Medicine     Referral Reason:   Specialty Services Required     Number of Visits Requested:   1         Requested Prescriptions     Signed Prescriptions Disp Refills    gabapentin (NEURONTIN) 300 MG capsule 180 capsule 1     Sig: Take 2 capsules (600 mg total) by mouth 3 (three) times daily.    ketorolac (TORADOL) 10 mg tablet 90 tablet 1     Sig: Take 1 tablet (10 mg total) by mouth every 8 (eight) hours as needed for Pain.       Assessment:     1. Lumbosacral radiculopathy    2. Piriformis syndrome, right    3. Dorsalgia, unspecified    4. Neck pain         A's of Opioid Risk Assessment  Activity:Patient can perform ADL.   Analgesia:Patients pain is partially controlled by current medication. Patient has tried OTC medications such as Tylenol and Ibuprofen with out relief.   Adverse Effects: Patient denies constipation or sedation.  Aberrant Behavior:  reviewed with no aberrant drug seeking/taking behavior.  Overdose reversal drug naloxone discussed    Drug screen reviewed      Plan:    Definitive drug screen February 7, 2022 THC positive otherwise negative    No x-ray results for review    She did start physical therapy last appointment she  attended was March 21, 2022, however she no showed for her last visit March 23 through April 4, 2022 and has not kept any further physical therapy appointment since that time    Here today requesting options for chronic right lower extremity pain numbness and tingling radicular in nature    She is rated resume physical therapy    Requesting to increase gabapentin 600 mg twice a day    Gabapentin 300 mg 2 tablets p.o. t.i.d.    Initially requested Toradol injection after joint medication up she changed her mind    Requesting Toradol tablets  Toradol 10 mg 1 p.o. q.8 hours as needed    Follow-up 2 months    Bring original prescription medication bottles/container/box with labels to each visit

## 2022-06-13 NOTE — TELEPHONE ENCOUNTER
----- Message from Kaur Alcocer sent at 6/13/2022  1:03 PM CDT -----  Regarding: r/f dr max  Patient states she has been to ER 3 times & shot that's being given is not helping. Patient wants top know who can refer her to Dr Max. Please call pt. @ [phone]    Pt is requesting to be referred to Dr Max.  Pt has been seen by Dr Blanco twice with last office visit in March.Pt has not had physical therapy or diagnostics according to Dr Blanco documentation in March.   Pt has followup with D Shows on 6-16 according to the computer, pt requests a sooner appt, RS from 6-16 to 6-14 at 0925, voiced understanding.

## 2022-06-14 ENCOUNTER — HOSPITAL ENCOUNTER (OUTPATIENT)
Dept: RADIOLOGY | Facility: HOSPITAL | Age: 32
Discharge: HOME | End: 2022-06-14
Attending: PHYSICIAN ASSISTANT
Payer: MEDICAID

## 2022-06-14 ENCOUNTER — OFFICE VISIT (OUTPATIENT)
Dept: PAIN MEDICINE | Facility: CLINIC | Age: 32
End: 2022-06-14
Payer: MEDICAID

## 2022-06-14 VITALS
BODY MASS INDEX: 53.92 KG/M2 | HEART RATE: 83 BPM | WEIGHT: 293 LBS | HEIGHT: 62 IN | SYSTOLIC BLOOD PRESSURE: 163 MMHG | DIASTOLIC BLOOD PRESSURE: 67 MMHG

## 2022-06-14 DIAGNOSIS — M54.17 LUMBOSACRAL RADICULOPATHY: ICD-10-CM

## 2022-06-14 DIAGNOSIS — M54.9 DORSALGIA, UNSPECIFIED: ICD-10-CM

## 2022-06-14 DIAGNOSIS — M54.2 NECK PAIN: ICD-10-CM

## 2022-06-14 DIAGNOSIS — M54.17 LUMBOSACRAL RADICULOPATHY: Primary | ICD-10-CM

## 2022-06-14 DIAGNOSIS — G57.01 PIRIFORMIS SYNDROME, RIGHT: Chronic | ICD-10-CM

## 2022-06-14 DIAGNOSIS — G57.01 PIRIFORMIS SYNDROME, RIGHT: ICD-10-CM

## 2022-06-14 PROCEDURE — 72100 X-RAY EXAM L-S SPINE 2/3 VWS: CPT | Mod: TC

## 2022-06-14 PROCEDURE — 99214 OFFICE O/P EST MOD 30 MIN: CPT | Mod: PBBFAC | Performed by: PHYSICIAN ASSISTANT

## 2022-06-14 PROCEDURE — 3077F PR MOST RECENT SYSTOLIC BLOOD PRESSURE >= 140 MM HG: ICD-10-PCS | Mod: CPTII,,, | Performed by: PHYSICIAN ASSISTANT

## 2022-06-14 PROCEDURE — 99214 PR OFFICE/OUTPT VISIT, EST, LEVL IV, 30-39 MIN: ICD-10-PCS | Mod: S$PBB,,, | Performed by: PHYSICIAN ASSISTANT

## 2022-06-14 PROCEDURE — 3077F SYST BP >= 140 MM HG: CPT | Mod: CPTII,,, | Performed by: PHYSICIAN ASSISTANT

## 2022-06-14 PROCEDURE — 73521 XR HIPS BILATERAL 2 VIEW INCL AP PELVIS: ICD-10-PCS | Mod: 26,,, | Performed by: RADIOLOGY

## 2022-06-14 PROCEDURE — 3008F PR BODY MASS INDEX (BMI) DOCUMENTED: ICD-10-PCS | Mod: CPTII,,, | Performed by: PHYSICIAN ASSISTANT

## 2022-06-14 PROCEDURE — 72100 XR LUMBAR SPINE AP AND LATERAL: ICD-10-PCS | Mod: 26,,, | Performed by: RADIOLOGY

## 2022-06-14 PROCEDURE — 3078F DIAST BP <80 MM HG: CPT | Mod: CPTII,,, | Performed by: PHYSICIAN ASSISTANT

## 2022-06-14 PROCEDURE — 1159F MED LIST DOCD IN RCRD: CPT | Mod: CPTII,,, | Performed by: PHYSICIAN ASSISTANT

## 2022-06-14 PROCEDURE — 72100 X-RAY EXAM L-S SPINE 2/3 VWS: CPT | Mod: 26,,, | Performed by: RADIOLOGY

## 2022-06-14 PROCEDURE — 99214 OFFICE O/P EST MOD 30 MIN: CPT | Mod: S$PBB,,, | Performed by: PHYSICIAN ASSISTANT

## 2022-06-14 PROCEDURE — 3008F BODY MASS INDEX DOCD: CPT | Mod: CPTII,,, | Performed by: PHYSICIAN ASSISTANT

## 2022-06-14 PROCEDURE — 3078F PR MOST RECENT DIASTOLIC BLOOD PRESSURE < 80 MM HG: ICD-10-PCS | Mod: CPTII,,, | Performed by: PHYSICIAN ASSISTANT

## 2022-06-14 PROCEDURE — 1159F PR MEDICATION LIST DOCUMENTED IN MEDICAL RECORD: ICD-10-PCS | Mod: CPTII,,, | Performed by: PHYSICIAN ASSISTANT

## 2022-06-14 PROCEDURE — 73521 X-RAY EXAM HIPS BI 2 VIEWS: CPT | Mod: TC

## 2022-06-14 PROCEDURE — 73521 X-RAY EXAM HIPS BI 2 VIEWS: CPT | Mod: 26,,, | Performed by: RADIOLOGY

## 2022-06-14 RX ORDER — GABAPENTIN 300 MG/1
600 CAPSULE ORAL 3 TIMES DAILY
Qty: 180 CAPSULE | Refills: 1 | Status: SHIPPED | OUTPATIENT
Start: 2022-06-14 | End: 2022-09-27

## 2022-06-14 RX ORDER — KETOROLAC TROMETHAMINE 30 MG/ML
60 INJECTION, SOLUTION INTRAMUSCULAR; INTRAVENOUS
Status: DISCONTINUED | OUTPATIENT
Start: 2022-06-14 | End: 2022-06-14

## 2022-06-14 RX ORDER — KETOROLAC TROMETHAMINE 10 MG/1
10 TABLET, FILM COATED ORAL EVERY 8 HOURS PRN
Qty: 90 TABLET | Refills: 1 | Status: SHIPPED | OUTPATIENT
Start: 2022-06-14 | End: 2022-09-27

## 2022-06-14 NOTE — LETTER
June 14, 2022      [facility]  [address]  [facility] [place] [address]  Phone: [phone]       Patient: Tone Nagy   YOB: 1990  Date of Visit: 06/14/2022    To Whom It May Concern:    Kishan Nagy  was at  on 06/14/2022. The patient may return to work/school on 6/15/22 restrictions. If you have any questions or concerns, or if I can be of further assistance, please do not hesitate to contact me.    Sincerely,    Parminder Alcocer LPN

## 2022-09-13 DIAGNOSIS — M54.17 LUMBOSACRAL RADICULOPATHY: Primary | ICD-10-CM

## 2022-09-14 ENCOUNTER — OFFICE VISIT (OUTPATIENT)
Dept: SPINE | Facility: CLINIC | Age: 32
End: 2022-09-14
Payer: MEDICAID

## 2022-09-14 ENCOUNTER — HOSPITAL ENCOUNTER (OUTPATIENT)
Dept: RADIOLOGY | Facility: HOSPITAL | Age: 32
Discharge: HOME | End: 2022-09-14
Attending: ORTHOPAEDIC SURGERY
Payer: MEDICAID

## 2022-09-14 DIAGNOSIS — M54.17 LUMBOSACRAL RADICULOPATHY: Primary | ICD-10-CM

## 2022-09-14 DIAGNOSIS — M54.17 LUMBOSACRAL RADICULOPATHY: ICD-10-CM

## 2022-09-14 PROCEDURE — 99204 PR OFFICE/OUTPT VISIT, NEW, LEVL IV, 45-59 MIN: ICD-10-PCS | Mod: S$PBB,,, | Performed by: ORTHOPAEDIC SURGERY

## 2022-09-14 PROCEDURE — 72110 XR LUMBAR SPINE 4-5 VIEW WITH BENDING VIEWS: ICD-10-PCS | Mod: 26,,, | Performed by: ORTHOPAEDIC SURGERY

## 2022-09-14 PROCEDURE — 72110 X-RAY EXAM L-2 SPINE 4/>VWS: CPT | Mod: TC

## 2022-09-14 PROCEDURE — 99211 OFF/OP EST MAY X REQ PHY/QHP: CPT | Mod: PBBFAC | Performed by: ORTHOPAEDIC SURGERY

## 2022-09-14 PROCEDURE — 72110 X-RAY EXAM L-2 SPINE 4/>VWS: CPT | Mod: 26,,, | Performed by: ORTHOPAEDIC SURGERY

## 2022-09-14 PROCEDURE — 99204 OFFICE O/P NEW MOD 45 MIN: CPT | Mod: S$PBB,,, | Performed by: ORTHOPAEDIC SURGERY

## 2022-09-14 NOTE — PROGRESS NOTES
MDM/time:  Greater than 45 minutes spent on this encounter including 15 minutes reviewing imaging and notes, 20 minutes with the patient, 10 minutes documentation    ASSESSMENT:  32 y.o. female with lumbar spondylosis with radiculopathy    PLAN:  Lyrica increased to 75 mg twice a day  Follow-up with  to discuss other epidural injections versus rhizotomies  Discussed weight loss management morbid obesity patient would need to get her weight down to at least 210 before considering surgery  Follow-up in 4 months    HPI:  32 y.o. female here for evaluation of back pain that radiates into the right buttocks down the back of the right leg.  Patient reports a history of back pain with no known injury.  Pain has been ongoing for the past year pain was initially in her buttocks and thigh now it is radiating into the right calf and foot.  Patient denies difficulty with  strength.  Reports issues with balance has had multiple falls.  Denies bladder bowel incontinence.  Difficulty walking distances due to pain and weakness in the right leg.  Currently taking tramadol, ibuprofen, Toradol and Lyrica for pain.  Has recently been to physical therapy but this caused her pain to increase.  She is had epidural injections with  with little to no pain relief.  [facility] 2022.  No prior spine surgery.  Patient is not a smoker.    IMAGIN2022 lumbar spine x-ray reviewed showed:   On the AP there is normal coronal alignment.  There are 5 non-rib-bearing lumbar vertebrae.  On the lateral there is decreased lumbar lordosis.  There is spondylotic disease with decreased disc height and osteophyte formation noted.  No fractures or listhesis noted.  No instability on flexion-extension views.    2022 MRI lumbar spine [facility] reviewed showed:  L5-S1 right paracentral disc herniation with severe right lateral recess stenosis    Past Medical History:   Diagnosis Date    Gallstones       Past Surgical History:   Procedure Laterality Date    LAPAROSCOPIC SURGICAL REMOVAL OF CYST OF OVARY       Social History     Tobacco Use    Smoking status: Every Day    Smokeless tobacco: Never   Substance Use Topics    Alcohol use: Never    Drug use: Yes     Types: Marijuana      Current Outpatient Medications   Medication Instructions    gabapentin (NEURONTIN) 600 mg, Oral, 3 times daily    ketorolac (TORADOL) 10 mg, Oral, Every 8 hours PRN        EXAM:  Constitutional  General Appearance:  Morbid obesity, NAD  Psychiatric   Orientation: Oriented to time, oriented to place, oriented to person  Mood and Affect: Active and alert, normal mood, normal affect  Gait and Station   Appearance:  Antalgic gait to the right, unable to tandem gait, unable to walk on toes, unable to walk on heels    LUMBAR  Musculoskeletal System   Hips: Normal appearance, no leg length discrepancy, normal motion; left, normal motion; right    Lumbar Spine                   Inspection:  Normal alignment, normal sagittal balance                  Range of motion:  Decreased flexion, extension, lateral bending, rotation. Pain with range of motion                  Bony Palpation of the Lumbar Spine:  No tenderness of the spinous process, no tenderness of the sacrum, no tenderness of the coccyx                  Bony Palpation of the Right Hip:  No tenderness of the iliac crest, no tenderness of the sciatic notch, no tenderness of the SI joint                  Bony Palpation of the Left Hip:  No tenderness of the iliac crest, no tenderness of the sciatic notch, no tenderness of the SI joint                  Soft Tissue Palpation on the Right:  No tenderness of the paraspinal region, no tenderness of the iliolumbar region                  Soft Tissue Palpation on the Left:  No tenderness of the paraspinal region, no tenderness of the iliolumbar region    Motor Strength   L1 Right:  Hip flexion iliopsoas 4/5    L1 Left:  Hip flexion iliopsoas  5/5              L2-L4 Right:  Knee extension quadriceps 4/5, tibialis anterior 4/5              L2-L4 Left:  Knee extension quadriceps 5/5, tibialis anterior 5/5   L5 Right:  Extensor hallucis llongus 4/5,    L5 Left:  Extensor hallucis longus 5/5,    S1 Right:  Plantar flexion gastrocnemius 4/5   S1 Left:  Plantar flexion gastrocnemius 5/5    Neurological System   Ankle Reflex Right:  normal   Ankle Reflex Left: normal   Knee Reflex Right:  normal   Knee Reflex Left:  normal   Sensation on the Right:  L2 normal, L3 normal, L4 normal, L5 normal, S1 normal   Sensation on the Left:  L2 normal, L3 normal, L4 normal, L5 normal, S1 normal              Special Test on the Right:  Seated straight leg raising test negative, no clonus of the ankle              Special Test on the Left:  Seated straight leg raising test negative, no clonus of the ankle    Skin   Lumbosacral Spine:  Normal skin    Cardiovascular System   Arterial Pulses Right:  Posterior tibialis normal, dorsalis pedis normal   Arterial Pulses Left:  Posterior tibialis normal, dorsalis pedis normal   Edema Right: None   Edema Left:  None

## 2022-09-14 NOTE — PROGRESS NOTES
Smoking Cessation counseling    Time spent:  3-10 minutes (28867)    We discussed the importance, over both the short and long-term, of smoking cessation; reviewed the patient's willingness to quit; overall history and current use of tobacco smoking products; that there are a variety of methods to help with smoking diminution and cessation (stopping alone, using nicotine substitution medications/gums, Chantix, other medications, etcetera, which the patient will also discuss with his or her primary care physician); that the patient should set a date for smoking cessation; and the patient will follow up with his or her primary care physician for further support and oversight.    We also discussed that for the patient to have surgery while using nicotine, wound healing may be compromised relative to those who do not smoke; that recovery from anesthesia may sometimes be more difficult; and that quitting may decrease the heart, vascular, pulmonary effects in the short term as well as over the long term.  Smoking cessation may be useful and important for any patient who will have a fusion as part of surgery or have bone or tissue that has regrown heal (bone fusions, wound closures, etc.)  since surgical results with respect to wound healing, susceptibility to recovery from infection, bone fusion, and tissue and blood vessel health may be impaired or less optimal in smokers than nonsmokers.  We talked about the elevated risk of surgical bone fusion failure in the setting of smoking and the potential need for a higher-risk revision surgery should fusion not occur.    I reviewed this with the patient in detail and all questions were answered.  We discussed nature of the patient's condition; real alternatives and realistic options; pros and cons, risks and benefits of all the options, in detail.  I believe the patient understands the above and wishes to proceed as outlined.

## 2022-09-20 ENCOUNTER — TELEPHONE (OUTPATIENT)
Dept: SPINE | Facility: CLINIC | Age: 32
End: 2022-09-20
Payer: MEDICAID

## 2022-09-20 NOTE — TELEPHONE ENCOUNTER
Called and scheduled appt with Deja Randall----- Message from Bhanu Hernandez, Patient Care Assistant sent at 9/19/2022 10:15 AM CDT -----  PATIENT WANTS TO BE REFERRED TO A THE DIETITIAN OVER AT [facility] SO SHE CAN LOSE WEIGHT AND HAVE HER SURGERY. PATIENTS NUMBER IS  [phone]

## 2022-09-27 ENCOUNTER — OFFICE VISIT (OUTPATIENT)
Dept: FAMILY MEDICINE | Facility: CLINIC | Age: 32
End: 2022-09-27
Payer: MEDICAID

## 2022-09-27 VITALS
HEIGHT: 62 IN | RESPIRATION RATE: 18 BRPM | HEART RATE: 83 BPM | WEIGHT: 293 LBS | TEMPERATURE: 98 F | BODY MASS INDEX: 53.92 KG/M2 | DIASTOLIC BLOOD PRESSURE: 79 MMHG | SYSTOLIC BLOOD PRESSURE: 138 MMHG | OXYGEN SATURATION: 100 %

## 2022-09-27 DIAGNOSIS — I10 HYPERTENSION, UNSPECIFIED TYPE: ICD-10-CM

## 2022-09-27 DIAGNOSIS — Z13.220 SCREENING FOR LIPID DISORDERS: ICD-10-CM

## 2022-09-27 DIAGNOSIS — Z11.59 NEED FOR HEPATITIS C SCREENING TEST: ICD-10-CM

## 2022-09-27 DIAGNOSIS — Z11.4 SCREENING FOR HIV (HUMAN IMMUNODEFICIENCY VIRUS): ICD-10-CM

## 2022-09-27 DIAGNOSIS — Z13.1 SCREENING FOR DIABETES MELLITUS: ICD-10-CM

## 2022-09-27 DIAGNOSIS — Z00.00 WELL ADULT HEALTH CHECK: Primary | ICD-10-CM

## 2022-09-27 LAB
ALBUMIN SERPL BCP-MCNC: 3.7 G/DL (ref 3.5–5)
ALBUMIN/GLOB SERPL: 1 {RATIO}
ALP SERPL-CCNC: 83 U/L (ref 37–98)
ALT SERPL W P-5'-P-CCNC: 49 U/L (ref 13–56)
ANION GAP SERPL CALCULATED.3IONS-SCNC: 8 MMOL/L (ref 7–16)
AST SERPL W P-5'-P-CCNC: 22 U/L (ref 15–37)
BASOPHILS # BLD AUTO: 0.1 K/UL (ref 0–0.2)
BASOPHILS NFR BLD AUTO: 0.9 % (ref 0–1)
BILIRUB SERPL-MCNC: 0.3 MG/DL (ref ?–1.2)
BUN SERPL-MCNC: 11 MG/DL (ref 7–18)
BUN/CREAT SERPL: 14 (ref 6–20)
CALCIUM SERPL-MCNC: 9.1 MG/DL (ref 8.5–10.1)
CHLORIDE SERPL-SCNC: 107 MMOL/L (ref 98–107)
CHOLEST SERPL-MCNC: 181 MG/DL (ref 0–200)
CHOLEST/HDLC SERPL: 3.9 {RATIO}
CO2 SERPL-SCNC: 29 MMOL/L (ref 21–32)
CREAT SERPL-MCNC: 0.78 MG/DL (ref 0.55–1.02)
DIFFERENTIAL METHOD BLD: ABNORMAL
EGFR (NO RACE VARIABLE) (RUSH/TITUS): 102 ML/MIN/1.73M²
EOSINOPHIL # BLD AUTO: 0.59 K/UL (ref 0–0.5)
EOSINOPHIL NFR BLD AUTO: 5.6 % (ref 1–4)
ERYTHROCYTE [DISTWIDTH] IN BLOOD BY AUTOMATED COUNT: 12.8 % (ref 11.5–14.5)
GLOBULIN SER-MCNC: 3.8 G/DL (ref 2–4)
GLUCOSE SERPL-MCNC: 77 MG/DL (ref 74–106)
HCT VFR BLD AUTO: 41.2 % (ref 38–47)
HCV AB SER QL: NORMAL
HDLC SERPL-MCNC: 47 MG/DL (ref 40–60)
HGB BLD-MCNC: 14.8 G/DL (ref 12–16)
HIV 1+O+2 AB SERPL QL: NORMAL
IMM GRANULOCYTES # BLD AUTO: 0.02 K/UL (ref 0–0.04)
IMM GRANULOCYTES NFR BLD: 0.2 % (ref 0–0.4)
LDLC SERPL CALC-MCNC: 112 MG/DL
LDLC/HDLC SERPL: 2.4 {RATIO}
LYMPHOCYTES # BLD AUTO: 4.25 K/UL (ref 1–4.8)
LYMPHOCYTES NFR BLD AUTO: 40.4 % (ref 27–41)
MCH RBC QN AUTO: 31.1 PG (ref 27–31)
MCHC RBC AUTO-ENTMCNC: 35.9 G/DL (ref 32–36)
MCV RBC AUTO: 86.6 FL (ref 80–96)
MONOCYTES # BLD AUTO: 0.8 K/UL (ref 0–0.8)
MONOCYTES NFR BLD AUTO: 7.6 % (ref 2–6)
MPC BLD CALC-MCNC: 10.1 FL (ref 9.4–12.4)
NEUTROPHILS # BLD AUTO: 4.77 K/UL (ref 1.8–7.7)
NEUTROPHILS NFR BLD AUTO: 45.3 % (ref 53–65)
NONHDLC SERPL-MCNC: 134 MG/DL
NRBC # BLD AUTO: 0 X10E3/UL
NRBC, AUTO (.00): 0 %
PLATELET # BLD AUTO: 364 K/UL (ref 150–400)
POTASSIUM SERPL-SCNC: 4.1 MMOL/L (ref 3.5–5.1)
PROT SERPL-MCNC: 7.5 G/DL (ref 6.4–8.2)
RBC # BLD AUTO: 4.76 M/UL (ref 4.2–5.4)
SODIUM SERPL-SCNC: 140 MMOL/L (ref 136–145)
TRIGL SERPL-MCNC: 111 MG/DL (ref 35–150)
VLDLC SERPL-MCNC: 22 MG/DL
WBC # BLD AUTO: 10.53 K/UL (ref 4.5–11)

## 2022-09-27 PROCEDURE — 1159F PR MEDICATION LIST DOCUMENTED IN MEDICAL RECORD: ICD-10-PCS | Mod: CPTII,,, | Performed by: NURSE PRACTITIONER

## 2022-09-27 PROCEDURE — 80053 COMPREHEN METABOLIC PANEL: CPT | Mod: ,,, | Performed by: CLINICAL MEDICAL LABORATORY

## 2022-09-27 PROCEDURE — 1159F MED LIST DOCD IN RCRD: CPT | Mod: CPTII,,, | Performed by: NURSE PRACTITIONER

## 2022-09-27 PROCEDURE — 90471 IMMUNIZATION ADMIN: CPT | Mod: ,,, | Performed by: NURSE PRACTITIONER

## 2022-09-27 PROCEDURE — 3075F SYST BP GE 130 - 139MM HG: CPT | Mod: CPTII,,, | Performed by: NURSE PRACTITIONER

## 2022-09-27 PROCEDURE — 3008F BODY MASS INDEX DOCD: CPT | Mod: CPTII,,, | Performed by: NURSE PRACTITIONER

## 2022-09-27 PROCEDURE — 99395 PR PREVENTIVE VISIT,EST,18-39: ICD-10-PCS | Mod: 25,,, | Performed by: NURSE PRACTITIONER

## 2022-09-27 PROCEDURE — 87389 HIV 1 / 2 ANTIBODY: ICD-10-PCS | Mod: ,,, | Performed by: CLINICAL MEDICAL LABORATORY

## 2022-09-27 PROCEDURE — 90715 TDAP VACCINE 7 YRS/> IM: CPT | Mod: ,,, | Performed by: NURSE PRACTITIONER

## 2022-09-27 PROCEDURE — 85025 COMPLETE CBC W/AUTO DIFF WBC: CPT | Mod: ,,, | Performed by: CLINICAL MEDICAL LABORATORY

## 2022-09-27 PROCEDURE — 86803 HEPATITIS C ANTIBODY: ICD-10-PCS | Mod: ,,, | Performed by: CLINICAL MEDICAL LABORATORY

## 2022-09-27 PROCEDURE — 90471 TDAP VACCINE GREATER THAN OR EQUAL TO 7YO IM: ICD-10-PCS | Mod: ,,, | Performed by: NURSE PRACTITIONER

## 2022-09-27 PROCEDURE — 80061 LIPID PANEL: CPT | Mod: ,,, | Performed by: CLINICAL MEDICAL LABORATORY

## 2022-09-27 PROCEDURE — 85025 CBC WITH DIFFERENTIAL: ICD-10-PCS | Mod: ,,, | Performed by: CLINICAL MEDICAL LABORATORY

## 2022-09-27 PROCEDURE — 80053 COMPREHENSIVE METABOLIC PANEL: ICD-10-PCS | Mod: ,,, | Performed by: CLINICAL MEDICAL LABORATORY

## 2022-09-27 PROCEDURE — 99395 PREV VISIT EST AGE 18-39: CPT | Mod: 25,,, | Performed by: NURSE PRACTITIONER

## 2022-09-27 PROCEDURE — 87389 HIV-1 AG W/HIV-1&-2 AB AG IA: CPT | Mod: ,,, | Performed by: CLINICAL MEDICAL LABORATORY

## 2022-09-27 PROCEDURE — 80061 LIPID PANEL: ICD-10-PCS | Mod: ,,, | Performed by: CLINICAL MEDICAL LABORATORY

## 2022-09-27 PROCEDURE — 3078F DIAST BP <80 MM HG: CPT | Mod: CPTII,,, | Performed by: NURSE PRACTITIONER

## 2022-09-27 PROCEDURE — 90715 TDAP VACCINE GREATER THAN OR EQUAL TO 7YO IM: ICD-10-PCS | Mod: ,,, | Performed by: NURSE PRACTITIONER

## 2022-09-27 PROCEDURE — 3008F PR BODY MASS INDEX (BMI) DOCUMENTED: ICD-10-PCS | Mod: CPTII,,, | Performed by: NURSE PRACTITIONER

## 2022-09-27 PROCEDURE — 3075F PR MOST RECENT SYSTOLIC BLOOD PRESS GE 130-139MM HG: ICD-10-PCS | Mod: CPTII,,, | Performed by: NURSE PRACTITIONER

## 2022-09-27 PROCEDURE — 3078F PR MOST RECENT DIASTOLIC BLOOD PRESSURE < 80 MM HG: ICD-10-PCS | Mod: CPTII,,, | Performed by: NURSE PRACTITIONER

## 2022-09-27 PROCEDURE — 86803 HEPATITIS C AB TEST: CPT | Mod: ,,, | Performed by: CLINICAL MEDICAL LABORATORY

## 2022-09-27 RX ORDER — PHENTERMINE HYDROCHLORIDE 37.5 MG/1
37.5 TABLET ORAL
Qty: 30 TABLET | Refills: 0 | Status: SHIPPED | OUTPATIENT
Start: 2022-09-27 | End: 2022-10-27

## 2022-09-27 RX ORDER — PREGABALIN 75 MG/1
CAPSULE ORAL
COMMUNITY
Start: 2022-08-31

## 2022-09-27 RX ORDER — TRAMADOL HYDROCHLORIDE 50 MG/1
TABLET ORAL
COMMUNITY
Start: 2022-08-09 | End: 2023-08-24 | Stop reason: ALTCHOICE

## 2022-09-27 RX ORDER — HYDROCHLOROTHIAZIDE 12.5 MG/1
12.5 TABLET ORAL DAILY
Qty: 30 TABLET | Refills: 11 | Status: SHIPPED | OUTPATIENT
Start: 2022-09-27 | End: 2023-09-27

## 2022-09-27 RX ORDER — IBUPROFEN 800 MG/1
TABLET ORAL
COMMUNITY
Start: 2022-08-09

## 2022-09-27 NOTE — LETTER
September 27, 2022      [facility] [address] [place] [address]  Phone: [phone]  Fax: [phone]       Patient: Tone Nagy   YOB: 1990  Date of Visit: 09/27/2022    To Whom It May Concern:    Kishan Nagy  was at [facility] on 09/27/2022. The patient may return to work/school on 9/28/2022 with no restrictions. If you have any questions or concerns, or if I can be of further assistance, please do not hesitate to contact me.    Sincerely,    PRIYANKA Danielle

## 2022-09-27 NOTE — PROGRESS NOTES
Ivelisse Rodriguez, PRIYANKA   [facility]      PATIENT NAME: Tone Nagy  : 1990  DATE: 22  MRN: [id number]      Patient PCP Information       Provider PCP Type    Bridget Loredo, PRIYANKA-C General          Chief Complaint      Chief Complaint   Patient presents with    Annual Exam     Room 8//       History of Present Illness        Tone Nagy is a 32 y.o. female who presents for routine wellness visit. Pt states she is doing well, concerns for increased weight, desire for weight loss mgmt. Pt denies FHx of colon, or cervical cancer. MGM with breast cancer Last colonoscopy was never. Last mammogram was never. Last pap was 3/2022. Declines flu shot. Nonsmoker. Working on improving diet and exercise.    Past Medical History:  Past Medical History:   Diagnosis Date    Gallstones        Past Surgical History:   has a past surgical history that includes Laparoscopic surgical removal of cyst of ovary.    Social History:  Social History     Tobacco Use    Smoking status: Former     Types: Cigarettes    Smokeless tobacco: Never   Substance Use Topics    Alcohol use: Never    Drug use: Yes     Types: Marijuana       I personally reviewed all past medical, surgical, and social.     Review of Systems   Constitutional:  Negative for activity change, appetite change, chills, diaphoresis, fatigue, fever and unexpected weight change.   HENT:  Negative for congestion, ear pain, facial swelling, hearing loss, nosebleeds and sore throat.    Eyes: Negative.    Respiratory:  Negative for apnea, cough, shortness of breath and wheezing.    Cardiovascular:  Negative for chest pain, palpitations and leg swelling.   Gastrointestinal:  Negative for abdominal distention, abdominal pain, blood in stool, constipation, diarrhea and nausea.   Endocrine: Negative for cold intolerance, heat intolerance, polydipsia, polyphagia and polyuria.   Genitourinary:  Negative for decreased urine volume, difficulty  urinating, dysuria, flank pain, frequency, hematuria and urgency.   Musculoskeletal:  Positive for back pain. Negative for arthralgias, joint swelling and myalgias.   Skin:  Negative for color change and rash.   Allergic/Immunologic: Negative.    Neurological:  Positive for numbness. Negative for dizziness, tremors, seizures, syncope, facial asymmetry, speech difficulty, weakness, light-headedness and headaches.   Hematological:  Negative for adenopathy. Does not bruise/bleed easily.   Psychiatric/Behavioral:  Negative for behavioral problems and confusion.       Medications:  Outpatient Encounter Medications as of 9/27/2022   Medication Sig Dispense Refill    ibuprofen (ADVIL,MOTRIN) 800 MG tablet       traMADoL (ULTRAM) 50 mg tablet TAKE 1 TABLET BY MOUTH ONCE OR TWICE A DAY WITH FOOD AS NEEDED FOR PAIN ..MAY CAUSE DROWSINESS- AVOID ALCOHOL      hydroCHLOROthiazide (HYDRODIURIL) 12.5 MG Tab Take 1 tablet (12.5 mg total) by mouth once daily. 30 tablet 11    phentermine (ADIPEX-P) 37.5 mg tablet Take 1 tablet (37.5 mg total) by mouth before breakfast. 30 tablet 0    pregabalin (LYRICA) 75 MG capsule TAKE 1 CAPSULE BY MOUTH TWICE A DAY ..MAY CAUSE DROWSINESS- AVOID ALCOHOL      [DISCONTINUED] gabapentin (NEURONTIN) 300 MG capsule Take 2 capsules (600 mg total) by mouth 3 (three) times daily. (Patient not taking: Reported on 9/27/2022) 180 capsule 1    [DISCONTINUED] ketorolac (TORADOL) 10 mg tablet Take 1 tablet (10 mg total) by mouth every 8 (eight) hours as needed for Pain. (Patient not taking: Reported on 9/27/2022) 90 tablet 1     No facility-administered encounter medications on file as of 9/27/2022.       Allergies:  Review of patient's allergies indicates:  No Known Allergies    Health Maintenance:  Immunization History   Administered Date(s) Administered    COVID-19, MRNA, LN-S, PF (MODERNA FULL 0.5 ML DOSE) 02/25/2021, 03/26/2021, 12/19/2021    Tdap 09/27/2022      Health Maintenance   Topic Date Due     "Hepatitis C Screening  Never done    TETANUS VACCINE  09/27/2032    Lipid Panel  Completed        Physical Exam      Vital Signs  Temp: 98.1 °F (36.7 °C)  Pulse: 83  Resp: 18  SpO2: 100 %  BP: 138/79  BP Location: Left arm  Patient Position: Sitting  Pain Score:   6  Pain Loc: Leg  Height and Weight  Height: 5' 2" (157.5 cm)  Weight: (!) 137.2 kg (302 lb 6 oz)  BSA (Calculated - sq m): 2.45 sq meters  BMI (Calculated): 55.3  Weight in (lb) to have BMI = 25: 136.4]    Physical Exam  Vitals and nursing note reviewed.   Constitutional:       Appearance: Normal appearance. She is obese.   HENT:      Head: Normocephalic.      Right Ear: Tympanic membrane, ear canal and external ear normal.      Left Ear: Tympanic membrane, ear canal and external ear normal.      Nose: Nose normal.      Mouth/Throat:      Mouth: Mucous membranes are moist.   Eyes:      Extraocular Movements: Extraocular movements intact.      Conjunctiva/sclera: Conjunctivae normal.      Pupils: Pupils are equal, round, and reactive to light.   Cardiovascular:      Rate and Rhythm: Normal rate and regular rhythm.      Pulses: Normal pulses.      Heart sounds: Normal heart sounds. No murmur heard.  Pulmonary:      Effort: Pulmonary effort is normal.      Breath sounds: Normal breath sounds. No stridor. No wheezing or rhonchi.   Abdominal:      General: Bowel sounds are normal. There is no distension.      Palpations: Abdomen is soft. There is no mass.      Tenderness: There is no abdominal tenderness.   Musculoskeletal:         General: No swelling or tenderness. Normal range of motion.      Cervical back: Normal range of motion and neck supple.      Right lower leg: No edema.      Left lower leg: No edema.   Skin:     General: Skin is warm and dry.      Capillary Refill: Capillary refill takes less than 2 seconds.   Neurological:      General: No focal deficit present.      Mental Status: She is alert and oriented to person, place, and time. Mental status " "is at baseline.      Cranial Nerves: No cranial nerve deficit.      Sensory: No sensory deficit.      Motor: No weakness.   Psychiatric:         Mood and Affect: Mood normal.         Behavior: Behavior normal.         Thought Content: Thought content normal.         Judgment: Judgment normal.        Vitals:    09/27/22 1415 09/27/22 1422   BP: 134/76 138/79   BP Location: Right arm Left arm   Patient Position: Sitting Sitting   Pulse: 83    Resp: 18    Temp: 98.1 °F (36.7 °C)    SpO2: 100%    Weight: (!) 137.2 kg (302 lb 6 oz)    Height: 5' 2" (1.575 m)      Depression Screening PHQA 9/27/2022   Over the last two weeks how often have you been bothered by feeling down, depressed or hopeless 0   Over the last two weeks how often have you been bothered by little interest or pleasure in doing things 0       Vision Screening    Right eye Left eye Both eyes   Without correction 20/25 20/25 20/20   With correction            Laboratory:  CBC:  Recent Labs   Lab 07/14/21  0847   WBC 9.66   RBC 4.59   Hemoglobin 14.1   Hematocrit 39.4   Platelet Count 308   MCV 85.8   MCH 30.7   MCHC 35.8     CMP:  Recent Labs   Lab 07/14/21  0847   Glucose 96   Calcium 8.6   Albumin 3.5   Total Protein 6.8   Sodium 142   Potassium 4.2   CO2 28   Chloride 109 H   BUN 11   Alk Phos 70   ALT 16   AST 14 L   Bilirubin, Total 0.3     LIPIDS:  Recent Labs   Lab 07/14/21  0847 12/28/21  1533   TSH 4.810 H 2.300   HDL Cholesterol 40  --    Cholesterol 200  --    Triglycerides 89  --    LDL Calculated 142  --    Cholesterol/HDL Ratio (Risk Factor) 5.0  --    Non-  --      TSH:  Recent Labs   Lab 07/14/21  0847 12/28/21  1533   TSH 4.810 H 2.300     A1C:  Recent Labs   Lab 07/14/21  0847   Hemoglobin A1C 4.9       Assessment/Plan     Tone Nagy is a 32 y.o.female with:     1. Well adult health check  - HIV 1/2 Ag/Ab (4th Gen); Future  - Hepatitis C Antibody; Future  - Comprehensive Metabolic Panel; Future  - Lipid Panel; Future  - " CBC Auto Differential; Future  - (In Office Administered) Tdap Vaccine  -discussed diet exercise and healthy eating habits   -pap smear 3/2022 per Dr Daniel records requested    2. Screening for HIV (human immunodeficiency virus)  - HIV 1/2 Ag/Ab (4th Gen); Future      3. Need for hepatitis C screening test  - Hepatitis C Antibody; Future      4. BMI 50.0-59.9, adult  - phentermine (ADIPEX-P) 37.5 mg tablet; Take 1 tablet (37.5 mg total) by mouth before breakfast.  Dispense: 30 tablet; Refill: 0 MONTH 1  -follow up in 1 mo for weight check  -discussed healthy eating habits, exercise     5. Screening for lipid disorders  - Lipid Panel; Future      6. Screening for diabetes mellitus  - Comprehensive Metabolic Panel; Future      7. Hypertension, unspecified type  - hydroCHLOROthiazide (HYDRODIURIL) 12.5 MG Tab; Take 1 tablet (12.5 mg total) by mouth once daily.  Dispense: 30 tablet; Refill: 11  -follow up 1 mo for weight check and bp check    Total time spent face-to-face and non-face-to-face coordinating care for this encounter was: >30 min    Chronic conditions status updated as per HPI.  Other than changes above, cont current medications and maintain follow up with specialists.  Return to clinic 1 year next wellness, 1 mo weight check .    [facility]

## 2023-04-04 ENCOUNTER — PATIENT MESSAGE (OUTPATIENT)
Dept: RESEARCH | Facility: HOSPITAL | Age: 33
End: 2023-04-04

## 2023-08-24 ENCOUNTER — OFFICE VISIT (OUTPATIENT)
Dept: FAMILY MEDICINE | Facility: CLINIC | Age: 33
End: 2023-08-24
Payer: MEDICAID

## 2023-08-24 VITALS
HEART RATE: 78 BPM | OXYGEN SATURATION: 97 % | BODY MASS INDEX: 53.92 KG/M2 | TEMPERATURE: 98 F | HEIGHT: 62 IN | WEIGHT: 293 LBS

## 2023-08-24 DIAGNOSIS — S99.921A INJURY OF TOENAIL OF RIGHT FOOT, INITIAL ENCOUNTER: Primary | ICD-10-CM

## 2023-08-24 DIAGNOSIS — M54.12 CERVICAL RADICULOPATHY: ICD-10-CM

## 2023-08-24 PROCEDURE — 3008F BODY MASS INDEX DOCD: CPT | Mod: CPTII,,,

## 2023-08-24 PROCEDURE — 99213 OFFICE O/P EST LOW 20 MIN: CPT | Mod: ,,,

## 2023-08-24 PROCEDURE — 1160F RVW MEDS BY RX/DR IN RCRD: CPT | Mod: CPTII,,,

## 2023-08-24 PROCEDURE — 99213 PR OFFICE/OUTPT VISIT, EST, LEVL III, 20-29 MIN: ICD-10-PCS | Mod: ,,,

## 2023-08-24 PROCEDURE — 3008F PR BODY MASS INDEX (BMI) DOCUMENTED: ICD-10-PCS | Mod: CPTII,,,

## 2023-08-24 PROCEDURE — 1159F MED LIST DOCD IN RCRD: CPT | Mod: CPTII,,,

## 2023-08-24 PROCEDURE — 1160F PR REVIEW ALL MEDS BY PRESCRIBER/CLIN PHARMACIST DOCUMENTED: ICD-10-PCS | Mod: CPTII,,,

## 2023-08-24 PROCEDURE — 1159F PR MEDICATION LIST DOCUMENTED IN MEDICAL RECORD: ICD-10-PCS | Mod: CPTII,,,

## 2023-08-24 RX ORDER — MUPIROCIN 20 MG/G
OINTMENT TOPICAL 3 TIMES DAILY
Qty: 15 G | Refills: 0 | Status: SHIPPED | OUTPATIENT
Start: 2023-08-24

## 2023-08-24 NOTE — PROGRESS NOTES
"  PATIENT NAME: Tone Nagy  : 1990  DATE: 23  MRN: [id number]      Reason for Visit / Chief Complaint: hurt toe (Room 8// Hurt toe last night patient hit it on a saud. )       Update PCP  Update Chief Complaint         History of Present Illness / Problem Focused Workflow     Tone Nagy presents to the clinic with hurt toe (Room 8// Hurt toe last night patient hit it on a saud. )     "Kicked" sons car saud while walking through house in the dark. Sustained injury to Right middle toe. Reports bleeding at time of injury. Bleeding controlled at present. Also concerned about intermittent hand numbness/tingling in mornings while doing hair. 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         Review of Systems     @Review of Systems   Constitutional:  Negative for fever.   Integumentary:  Positive for wound.   Neurological:  Positive for numbness.        Numbness/tingling in hands only in the mornings while doing hair.          Medical / Social / Family History     Past Medical History:   Diagnosis Date    Gallstones        Past Surgical History:   Procedure Laterality Date    LAPAROSCOPIC SURGICAL REMOVAL OF CYST OF OVARY         Social History  Ms.  reports that she has quit smoking. Her smoking use included cigarettes. She has never used smokeless tobacco. She reports current drug use. Drug: Marijuana. She reports that she does not drink alcohol.    Family History  Ms.'s family history includes Breast cancer in her maternal grandmother; Diabetes in her maternal grandfather and maternal grandmother; Hypertension in her maternal grandmother; Hypothyroidism in her maternal grandmother; Kidney disease in her maternal grandfather.    Medications and Allergies     Medications  No outpatient medications have been marked as taking for the 23 encounter (Office Visit) with Mayela Jaramillo, " RALF.       Allergies  Review of patient's allergies indicates:  No Known Allergies    Physical Examination     Vitals:    08/24/23 0830   Pulse: 78   Temp: 98 °F (36.7 °C)     Physical Exam  Cardiovascular:      Pulses:           Dorsalis pedis pulses are 2+ on the right side and 2+ on the left side.        Posterior tibial pulses are 2+ on the right side and 2+ on the left side.   Skin:     General: Skin is warm.      Capillary Refill: Capillary refill takes less than 2 seconds.      Findings: Laceration and wound present. No ecchymosis or erythema.                              Procedures   Assessment and Plan (including Health Maintenance)      Problem List  Smart Sets  Document Outside HM   :    Plan:   Problem List Items Addressed This Visit          Neuro    Cervical radiculopathy    Current Assessment & Plan     Hand numbness when doing hair in the mornings no problems otherwise  Neck exercises/stretches given  Discussed referral to PT at next visit if persistent             Orthopedic    Injury of toenail of right foot - Primary    Current Assessment & Plan     Mupirocin ointment twice daily to affected toe  Wound care instructions discussed.   Keep wound covered but allow to get air daily.  RTC for any signs of infection as discussed.               Future Appointments   Date Time Provider Department Center   9/28/2023  2:00 PM Ivelisse Rodriguez FNP [facility]         Follow up if symptoms worsen or fail to improve.     Signature:  RALF LARA        Date of encounter: 8/24/23

## 2023-08-24 NOTE — PATIENT INSTRUCTIONS
Clean toe with antibacterial soap twice daily or more if it becomes dirty. Apply mupirocin ointment twice daily. Keep covered with bandaid while out but let it air out daily. Return to clinic for any increased redness, drainage, pain, swelling, or fever or other concerns. The nail may come off but avoid pulling on it to speed the process up. Do neck exercises/stretches at home and we will discuss PT at next visit.

## 2023-08-24 NOTE — LETTER
August 24, 2023      [facility]  [address]  [place] [address]  Phone: [phone]  Fax: [phone]       Patient: Tone Nagy   YOB: 1990  Date of Visit: 08/24/2023    To Whom It May Concern:    Kishan Nagy  was at [facility] on 08/24/2023. The patient may return to work/school on 8/25/23 with no restrictions. If you have any questions or concerns, or if I can be of further assistance, please do not hesitate to contact me.    Sincerely,    Diann Leo MA

## 2023-08-24 NOTE — ASSESSMENT & PLAN NOTE
Mupirocin ointment twice daily to affected toe  Wound care instructions discussed.   Keep wound covered but allow to get air daily.  RTC for any signs of infection as discussed.

## 2023-08-24 NOTE — ASSESSMENT & PLAN NOTE
Hand numbness when doing hair in the mornings no problems otherwise  Neck exercises/stretches given  Discussed referral to PT at next visit if persistent

## 2023-10-17 ENCOUNTER — TELEPHONE (OUTPATIENT)
Dept: ADMINISTRATIVE | Facility: HOSPITAL | Age: 33
End: 2023-10-17

## 2023-10-17 ENCOUNTER — PATIENT OUTREACH (OUTPATIENT)
Dept: ADMINISTRATIVE | Facility: HOSPITAL | Age: 33
End: 2023-10-17

## 2023-10-17 VITALS — SYSTOLIC BLOOD PRESSURE: 136 MMHG | DIASTOLIC BLOOD PRESSURE: 72 MMHG

## 2023-10-17 NOTE — PROGRESS NOTES
10/17/2023   Chart accessed for: Cervical Cancer Screening/Hypertension  Care Gaps addressed: All HM Topics  Outreach made to patient via: Telephone. Patient stated most recent Pap was with Dr. Daniel one year ago. Most recent BP was 136/72.     Care Everywhere Updated and Reviewed.   Reviewed.  [facility] and [facility] Reviewed.    Requested Pap with Dr. Daniel.  No upcoming appointments scheduled at this time.      Health Maintenance Due   Topic Date Due    Cervical Cancer Screening  Never done    Influenza Vaccine (1) 09/01/2023    COVID-19 Vaccine (4 - 2023-24 season) 09/01/2023

## 2023-10-17 NOTE — LETTER
AUTHORIZATION FOR RELEASE OF   CONFIDENTIAL INFORMATION    Dear Dr. Daniel,    We are seeing Tone Nagy, date of birth 1990, in the clinic at [facility]. Mayela Jaramillo FNP-C is the patient's PCP. Tone Nagy has an outstanding lab/procedure at the time we reviewed her chart. In order to help keep her health information updated, she has authorized us to request the following medical record(s):        (  )  MAMMOGRAM                                      (  )  COLONOSCOPY      ( x )  PAP SMEAR                                          (  )  OUTSIDE LAB RESULTS     (  )  DEXA SCAN                                          (  )  EYE EXAM            (  )  FOOT EXAM                                          (  )  ENTIRE RECORD     (  )  OUTSIDE IMMUNIZATIONS                 (  )  _______________         Please fax records to Miriam Max LPN Care Coordinator at [phone].      If you have any questions, please contact Miriam at [phone].           Patient Name: Tone Nagy  : 1990  Patient Phone #: [phone]

## 2023-10-28 ENCOUNTER — PATIENT OUTREACH (OUTPATIENT)
Dept: ADMINISTRATIVE | Facility: HOSPITAL | Age: 33
End: 2023-10-28

## 2023-10-28 NOTE — PROGRESS NOTES
Cervical Cancer Screening [x] External Records Uploaded (October 2019 Pap - Dr. Daniel), Care Team and Medical History Updated                         **Due for Updated Pap - No upcoming appts scheduled at this time**

## 2024-06-14 ENCOUNTER — PATIENT MESSAGE (OUTPATIENT)
Dept: FAMILY MEDICINE | Facility: CLINIC | Age: 34
End: 2024-06-14
Payer: MEDICAID

## 2025-03-07 ENCOUNTER — HOSPITAL ENCOUNTER (EMERGENCY)
Facility: HOSPITAL | Age: 35
Discharge: HOME | End: 2025-03-07

## 2025-03-07 VITALS
BODY MASS INDEX: 51.62 KG/M2 | HEART RATE: 83 BPM | WEIGHT: 280.5 LBS | RESPIRATION RATE: 18 BRPM | DIASTOLIC BLOOD PRESSURE: 74 MMHG | OXYGEN SATURATION: 100 % | HEIGHT: 62 IN | SYSTOLIC BLOOD PRESSURE: 140 MMHG | TEMPERATURE: 97 F

## 2025-03-07 DIAGNOSIS — T50.905A DRUG-INDUCED NAUSEA AND VOMITING: ICD-10-CM

## 2025-03-07 DIAGNOSIS — R11.2 DRUG-INDUCED NAUSEA AND VOMITING: ICD-10-CM

## 2025-03-07 DIAGNOSIS — R11.2 NAUSEA AND VOMITING, UNSPECIFIED VOMITING TYPE: Primary | ICD-10-CM

## 2025-03-07 PROCEDURE — 63600175 PHARM REV CODE 636 W HCPCS

## 2025-03-07 PROCEDURE — 99284 EMERGENCY DEPT VISIT MOD MDM: CPT | Mod: 25

## 2025-03-07 PROCEDURE — 96372 THER/PROPH/DIAG INJ SC/IM: CPT

## 2025-03-07 RX ORDER — PROMETHAZINE HYDROCHLORIDE 25 MG/1
25 TABLET ORAL EVERY 6 HOURS PRN
Qty: 15 TABLET | Refills: 0 | Status: SHIPPED | OUTPATIENT
Start: 2025-03-07

## 2025-03-07 RX ORDER — PROMETHAZINE HYDROCHLORIDE 25 MG/ML
25 INJECTION, SOLUTION INTRAMUSCULAR; INTRAVENOUS
Status: COMPLETED | OUTPATIENT
Start: 2025-03-07 | End: 2025-03-07

## 2025-03-07 RX ADMIN — PROMETHAZINE HYDROCHLORIDE 25 MG: 25 INJECTION INTRAMUSCULAR; INTRAVENOUS at 05:03

## 2025-03-07 NOTE — ED PROVIDER NOTES
Encounter Date: 3/7/2025       History     Chief Complaint   Patient presents with    Nausea    Vomiting     Presents to ED for complaints of nausea and vomiting since taking her Wegovy shot this morning.     TK is a 35 y/o AAF     Patient has no significant PMHx    Patient presents to the emergency department POV with complaint of nausea/vomiting since increasing dosage on Wegovy injection.  Patient stated that she has not had Wegovy in 1 month's time.  Patient stated nausea and vomiting started this morning 1 hour post injection.  Patient denies any chest pain, shortness of breath, chills, fever, and or abdominal pain.  Patient is taking medication for weight loss management.    The history is provided by the patient.   Nausea  This is a new problem. The problem occurs hourly. The problem has not changed since onset.Pertinent negatives include no abdominal pain and no headaches.   Vomiting   This is a new problem. The current episode started today. The problem occurs 5 - 10 times per day. The problem has been unchanged. The emesis has an appearance of stomach contents. Pertinent negatives include no abdominal pain, no arthralgias, no chills, no cough, no diarrhea, no fever, no headaches, no myalgias, no sweats and no URI.     Review of patient's allergies indicates:  No Known Allergies  Past Medical History:   Diagnosis Date    Gallstones     Pap smear for cervical cancer screening 10/08/2019    Dr. John Daniel     Past Surgical History:   Procedure Laterality Date    LAPAROSCOPIC SURGICAL REMOVAL OF CYST OF OVARY       Family History   Problem Relation Name Age of Onset    Hypertension Maternal Grandmother      Diabetes Maternal Grandmother      Breast cancer Maternal Grandmother      Hypothyroidism Maternal Grandmother      Diabetes Maternal Grandfather      Kidney disease Maternal Grandfather       Social History[1]  Review of Systems   Constitutional:  Negative for chills and fever.   Respiratory:  Negative  for cough.    Gastrointestinal:  Positive for nausea and vomiting. Negative for abdominal pain and diarrhea.   Musculoskeletal:  Negative for arthralgias and myalgias.   Neurological:  Negative for headaches.       Physical Exam     Initial Vitals [03/07/25 1711]   BP Pulse Resp Temp SpO2   (!) 140/74 83 18 97.3 °F (36.3 °C) 100 %      MAP       --         Physical Exam    Nursing note and vitals reviewed.  Constitutional: Vital signs are normal. She appears well-developed and well-nourished. She is not diaphoretic. She is cooperative.  Non-toxic appearance. She does not have a sickly appearance. She does not appear ill. No distress.   Cardiovascular:  Normal rate, regular rhythm, S1 normal, S2 normal, normal heart sounds, intact distal pulses and normal pulses.           Pulmonary/Chest: Effort normal and breath sounds normal.   Abdominal: Abdomen is soft and flat. There is no abdominal tenderness. No hernia.     Lymphadenopathy:     She has no cervical adenopathy.     She has no axillary adenopathy.   Neurological: She is alert and oriented to person, place, and time. She has normal strength and normal reflexes. She displays normal reflexes. No cranial nerve deficit or sensory deficit. She displays a negative Romberg sign. GCS eye subscore is 4. GCS verbal subscore is 5. GCS motor subscore is 6.   Skin: Skin is warm, dry and intact. Capillary refill takes less than 2 seconds. No rash noted.   Psychiatric: She has a normal mood and affect. Her speech is normal and behavior is normal. Judgment and thought content normal. Cognition and memory are normal.         Medical Screening Exam   See Full Note    ED Course   Procedures  Labs Reviewed - No data to display       Imaging Results    None          Medications   promethazine injection 25 mg (has no administration in time range)     Medical Decision Making  TK is a 33 y/o AAF     Patient has no significant PMHx    Patient presents to the emergency department POV with  complaint of nausea/vomiting since increasing dosage on Wegovy injection.  Patient stated that she has not had Wegovy in 1 month's time.  Patient stated nausea and vomiting started this morning 1 hour post injection.  Patient denies any chest pain, shortness of breath, chills, fever, and or abdominal pain.  Patient is taking medication for weight loss management.    The history is provided by the patient.   Nausea  This is a new problem. The problem occurs hourly. The problem has not changed since onset.Pertinent negatives include no abdominal pain and no headaches.   Vomiting   This is a new problem. The current episode started today. The problem occurs 5 - 10 times per day. The problem has been unchanged. The emesis has an appearance of stomach contents. Pertinent negatives include no abdominal pain, no arthralgias, no chills, no cough, no diarrhea, no fever, no headaches, no myalgias, no sweats and no URI.       Amount and/or Complexity of Data Reviewed  Discussion of management or test interpretation with external provider(s): Phenergan 25 IM  Rx for Phenergan 25 mg take as directed    Risk  Prescription drug management.  Risk Details: Nausea/vomiting   Drug-induced nausea vomiting   Wegovy injection                                      Clinical Impression:   Final diagnoses:  [R11.2] Nausea and vomiting, unspecified vomiting type (Primary)  [R11.2, T50.905A] Drug-induced nausea and vomiting        ED Disposition Condition    Discharge Stable          ED Prescriptions       Medication Sig Dispense Start Date End Date Auth. Provider    promethazine (PHENERGAN) 25 MG tablet Take 1 tablet (25 mg total) by mouth every 6 (six) hours as needed for Nausea. 15 tablet 3/7/2025 -- Herber Garza FNP          Follow-up Information       Follow up With Specialties Details Why Contact Info    Mayela Jaramillo FNP-[facility]  As needed, If symptoms worsen [address] [place] [id number]  [phone]                  [1]   Social History  Tobacco Use    Smoking status: Former     Types: Cigarettes    Smokeless tobacco: Never   Substance Use Topics    Alcohol use: Never    Drug use: Yes     Types: Marijuana        Herber Garza FNP  03/07/25 1740

## 2025-03-07 NOTE — DISCHARGE INSTRUCTIONS
- take Phenergan as directed by the label on the bottle   -stop Wegovy injections and follow up with PCP  -return to the emergency department if nausea and vomiting does not stop 2 days  -drink plenty of fluids